# Patient Record
Sex: FEMALE | Race: WHITE | NOT HISPANIC OR LATINO | URBAN - METROPOLITAN AREA
[De-identification: names, ages, dates, MRNs, and addresses within clinical notes are randomized per-mention and may not be internally consistent; named-entity substitution may affect disease eponyms.]

---

## 2018-01-09 NOTE — RESULT NOTES
Verified Results  (1) THIN PREP PAP WITH IMAGING [id number] Kaylen Sanchez     Test Name Result Flag Reference   LAB AP CASE REPORT (Report)     Gynecologic Cytology Report            Case: [id number]                  Authorizing Provider: VERA Hair    Collected:      10/27/2016 1103        First Screen:     CHRISTIE Toledo Received:      11/02/2016 1103        Specimen:  LIQUID-BASED PAP, SCREENING, Cervix, Endocervical   LAB AP GYN PRIMARY INTERPRETATION      Negative for intraepithelial lesion or malignancy  Electronically signed by CHRISTIE Toledo on 11/4/2016 at 12:00 PM   LAB AP GYN SPECIMEN ADEQUACY      Satisfactory for evaluation  Partially obscuring blood  LAB AP GYN ADDITIONAL INFORMATION (Report)     [facility] FDA approved ,  and ThinPrep Imaging System are   utilized with strict adherence to the 's instruction manual to   prepare gynecologic and non-gynecologic cytology specimens for the   production of ThinPrep slides as well as for gynecologic ThinPrep imaging  These processes have been validated by our laboratory and/or by the     The Pap test is not a diagnostic procedure and should not be used as the   sole means to detect cervical cancer  It is only a screening procedure to   aid in the detection of cervical cancer and its precursors  Both   false-negative and false-positive results have been experienced  Your   patient's test result should be interpreted in this context together with   the history and clinical findings     LAB AP LMP

## 2023-06-30 ENCOUNTER — OFFICE VISIT (OUTPATIENT)
Dept: PRIMARY CARE | Facility: CLINIC | Age: 33
End: 2023-06-30
Payer: COMMERCIAL

## 2023-06-30 VITALS — WEIGHT: 125 LBS | HEIGHT: 68 IN | BODY MASS INDEX: 18.94 KG/M2

## 2023-06-30 DIAGNOSIS — Z00.00 ROUTINE GENERAL MEDICAL EXAMINATION AT A HEALTH CARE FACILITY: Primary | ICD-10-CM

## 2023-06-30 LAB
ALANINE AMINOTRANSFERASE (SGPT) (U/L) IN SER/PLAS: 10 U/L (ref 7–45)
ALBUMIN (G/DL) IN SER/PLAS: 4.7 G/DL (ref 3.4–5)
ALKALINE PHOSPHATASE (U/L) IN SER/PLAS: 49 U/L (ref 33–110)
ANION GAP IN SER/PLAS: 13 MMOL/L (ref 10–20)
ASPARTATE AMINOTRANSFERASE (SGOT) (U/L) IN SER/PLAS: 16 U/L (ref 9–39)
BASOPHILS (10*3/UL) IN BLOOD BY AUTOMATED COUNT: 0.08 X10E9/L (ref 0–0.1)
BASOPHILS/100 LEUKOCYTES IN BLOOD BY AUTOMATED COUNT: 1.5 % (ref 0–2)
BILIRUBIN TOTAL (MG/DL) IN SER/PLAS: 0.6 MG/DL (ref 0–1.2)
CALCIDIOL (25 OH VITAMIN D3) (NG/ML) IN SER/PLAS: 31 NG/ML
CALCIUM (MG/DL) IN SER/PLAS: 9.8 MG/DL (ref 8.6–10.6)
CARBON DIOXIDE, TOTAL (MMOL/L) IN SER/PLAS: 27 MMOL/L (ref 21–32)
CHLORIDE (MMOL/L) IN SER/PLAS: 103 MMOL/L (ref 98–107)
CHOLESTEROL (MG/DL) IN SER/PLAS: 179 MG/DL (ref 0–199)
CHOLESTEROL IN HDL (MG/DL) IN SER/PLAS: 66.8 MG/DL
CHOLESTEROL/HDL RATIO: 2.7
CREATININE (MG/DL) IN SER/PLAS: 0.77 MG/DL (ref 0.5–1.05)
EOSINOPHILS (10*3/UL) IN BLOOD BY AUTOMATED COUNT: 0.09 X10E9/L (ref 0–0.7)
EOSINOPHILS/100 LEUKOCYTES IN BLOOD BY AUTOMATED COUNT: 1.7 % (ref 0–6)
ERYTHROCYTE DISTRIBUTION WIDTH (RATIO) BY AUTOMATED COUNT: 12.9 % (ref 11.5–14.5)
ERYTHROCYTE MEAN CORPUSCULAR HEMOGLOBIN CONCENTRATION (G/DL) BY AUTOMATED: 31.6 G/DL (ref 32–36)
ERYTHROCYTE MEAN CORPUSCULAR VOLUME (FL) BY AUTOMATED COUNT: 95 FL (ref 80–100)
ERYTHROCYTES (10*6/UL) IN BLOOD BY AUTOMATED COUNT: 4.39 X10E12/L (ref 4–5.2)
ESTIMATED AVERAGE GLUCOSE FOR HBA1C: 103 MG/DL
GFR FEMALE: >90 ML/MIN/1.73M2
GLUCOSE (MG/DL) IN SER/PLAS: 63 MG/DL (ref 74–99)
HEMATOCRIT (%) IN BLOOD BY AUTOMATED COUNT: 41.5 % (ref 36–46)
HEMOGLOBIN (G/DL) IN BLOOD: 13.1 G/DL (ref 12–16)
HEMOGLOBIN A1C/HEMOGLOBIN TOTAL IN BLOOD: 5.2 %
IMMATURE GRANULOCYTES/100 LEUKOCYTES IN BLOOD BY AUTOMATED COUNT: 0.2 % (ref 0–0.9)
LDL: 87 MG/DL (ref 0–99)
LEUKOCYTES (10*3/UL) IN BLOOD BY AUTOMATED COUNT: 5.3 X10E9/L (ref 4.4–11.3)
LYMPHOCYTES (10*3/UL) IN BLOOD BY AUTOMATED COUNT: 1.8 X10E9/L (ref 1.2–4.8)
LYMPHOCYTES/100 LEUKOCYTES IN BLOOD BY AUTOMATED COUNT: 33.7 % (ref 13–44)
MONOCYTES (10*3/UL) IN BLOOD BY AUTOMATED COUNT: 0.55 X10E9/L (ref 0.1–1)
MONOCYTES/100 LEUKOCYTES IN BLOOD BY AUTOMATED COUNT: 10.3 % (ref 2–10)
NEUTROPHILS (10*3/UL) IN BLOOD BY AUTOMATED COUNT: 2.81 X10E9/L (ref 1.2–7.7)
NEUTROPHILS/100 LEUKOCYTES IN BLOOD BY AUTOMATED COUNT: 52.6 % (ref 40–80)
NRBC (PER 100 WBCS) BY AUTOMATED COUNT: 0 /100 WBC (ref 0–0)
PLATELETS (10*3/UL) IN BLOOD AUTOMATED COUNT: 271 X10E9/L (ref 150–450)
POTASSIUM (MMOL/L) IN SER/PLAS: 4.1 MMOL/L (ref 3.5–5.3)
PROTEIN TOTAL: 7.4 G/DL (ref 6.4–8.2)
SODIUM (MMOL/L) IN SER/PLAS: 139 MMOL/L (ref 136–145)
THYROTROPIN (MIU/L) IN SER/PLAS BY DETECTION LIMIT <= 0.05 MIU/L: 1.61 MIU/L (ref 0.44–3.98)
TRIGLYCERIDE (MG/DL) IN SER/PLAS: 124 MG/DL (ref 0–149)
UREA NITROGEN (MG/DL) IN SER/PLAS: 17 MG/DL (ref 6–23)
VLDL: 25 MG/DL (ref 0–40)

## 2023-06-30 PROCEDURE — 85025 COMPLETE CBC W/AUTO DIFF WBC: CPT

## 2023-06-30 PROCEDURE — 99395 PREV VISIT EST AGE 18-39: CPT | Performed by: INTERNAL MEDICINE

## 2023-06-30 PROCEDURE — 80061 LIPID PANEL: CPT

## 2023-06-30 PROCEDURE — 82306 VITAMIN D 25 HYDROXY: CPT

## 2023-06-30 PROCEDURE — 83036 HEMOGLOBIN GLYCOSYLATED A1C: CPT

## 2023-06-30 PROCEDURE — 80053 COMPREHEN METABOLIC PANEL: CPT

## 2023-06-30 PROCEDURE — 84443 ASSAY THYROID STIM HORMONE: CPT

## 2023-06-30 NOTE — PROGRESS NOTES
"  Irma Dobson is a 31 yo F Presenting for wellness visit; last seen in 2017.     1. Bechets disease   -diagnosed 11 yo after presenting with genital ulcers, h/o oral apthous ulcers dating to early childhood   -last genital ulcerations or posterior pharynx ulcerations at 17 yo, still consistent apthous ulcers with stress, fatigue or oral trauma   -manages with colchicine   -has used prednisone tapers in past but not in a long time   -last summer: vesicular rash across torso, self resolved  -occ lesions along fingers, tender - come and go   [ ]prednisone bursts as needed       #HM   -due labs   -pap 6.23 with gyne   -tdap 2022     68\"   125 lbs in 6.23       Gen Aox3 NAD well appearing   HEENT mmm pharynx clear no cervical LAD   Eyes sclerae clear   CV rrr nl s1/2 no m/r/g  Pulm CTAB no adventitious sounds   Ext warm dry no edema 2+ DP pulse     "

## 2023-06-30 NOTE — PATIENT INSTRUCTIONS
Irma,     Call the office of Dr. Vero Hunter to schedule dermatology outside of system; you will also be called by  scheduling to see what they can offer. [phone].

## 2023-11-03 ENCOUNTER — PHARMACY VISIT (OUTPATIENT)
Dept: PHARMACY | Facility: CLINIC | Age: 33
End: 2023-11-03
Payer: COMMERCIAL

## 2023-11-03 PROCEDURE — RXMED WILLOW AMBULATORY MEDICATION CHARGE

## 2023-11-03 RX ORDER — BENZONATATE 100 MG/1
100 CAPSULE ORAL EVERY 8 HOURS
Qty: 30 CAPSULE | Refills: 2 | OUTPATIENT
Start: 2023-11-03 | End: 2024-04-24 | Stop reason: WASHOUT

## 2023-11-03 RX ORDER — ONDANSETRON 4 MG/1
4 TABLET, ORALLY DISINTEGRATING ORAL EVERY 6 HOURS
Qty: 30 TABLET | Refills: 2 | OUTPATIENT
Start: 2023-11-03 | End: 2024-04-24 | Stop reason: WASHOUT

## 2024-01-22 PROCEDURE — RXMED WILLOW AMBULATORY MEDICATION CHARGE

## 2024-01-24 ENCOUNTER — PHARMACY VISIT (OUTPATIENT)
Dept: PHARMACY | Facility: CLINIC | Age: 34
End: 2024-01-24
Payer: COMMERCIAL

## 2024-04-08 ASSESSMENT — LIFESTYLE VARIABLES
HISTORY_ALCOHOL_USE: YES
TOBACCO_USE: NO

## 2024-04-08 NOTE — PROGRESS NOTES
Visit Type: In Person    NEW FERTILITY PATIENT VISIT    Referred by: Referred by:: No one  Zulay Butcher  Accompanied today by: Who is accompanying you to your visit:: No one  self    Irma Dobson is a 33 y.o.  female who presents for fertility consultation. IUD removed 2023. She has tried 3 rounds of letrozole (2.5mg x 2, 5mg x1). OPKs + on CD13 or 14.     With her prior pregnancy, she underwent one round of Letrozole with timed intercourse to assist with conception. Also used luteal progesterone.    History notable for:  - Behcet's disease manifesting as vaginal/oral ulcers  - Factor V Leiden heterozygosity (was not on anticoagulation in pregnancy)  - Partner with family history of congenital heart disease    PRIOR EVALUATION / TREATMENT  [facility] in   SIS unremarkable  AMH 0.88 in 2021  FSH 10.06  E2 48.9  Partner SA  Volume 4.36 mL  Conc 99 million/mL  Motility 49%   million  Morphology 9%    Prior Labs  Lab Results    Date Done      AMH: No results found for requested labs within last 1825 days. No results found for requested labs within last 1825 days.   TSH: 1.61 (Ref range: 0.44 - 3.98 mIU/L) 2023   HgbA1c: 5.2 (Ref range: %) 2023   Hepatitis B surface antigen: NONREACTIVE (Ref range: NONREACTIVE) 2021   Hepatitis C antibody: NONREACTIVE (Ref range: NONREACTIVE) 2021   HIV ½ Antigen Antibody screen with reflex: NONREACTIVE (Ref range: NONREACTIVE) 2021   Syphilis screening with reflex: No results found for requested labs within last 1825 days. 3/18/2022   GC: NEGATIVE (Ref range: Negative) 2021   CT: NEGATIVE (Ref range: Negative) 2021   Type and Screen: A 3/18/2022   Rh: POS 3/18/2022   Rubella: POSITIVE (Ref range: ) 2021   Creatinine: 0.77 (Ref range: 0.50 - 1.05 mg/dL) 2023   AST:16 (Ref range: 9 - 39 U/L) 2023   ALT:10 (Ref range: 7 - 45 U/L): 2023     Relationship Status:        OB Hx     3/18/2022  at  37.1wga ([facility]), 2nd degree tear, boy (Keerthi Barrera), Birth weight was 7 lbs, 13 oz; interval PP IUD placed     GYN HISTORY   Have you ever been diagnosed with a sexually transmitted disease?: No  Have you ever had Pelvic Inflammatory Disease?: No  Have you had an abnormal PAP smear?: No  Date & Result of last PAP smear: 6/16/23 NIL/HR HPV neg  Have you ever had an abnormal mammogram?: No  Date & result of your last mammogram:  na  Do you have pelvic pain?: No  If applicable, how many times a week are you having intercourse, trying to get pregnant during your fertile window?: 3-4  Timing with ovulation  Do you have pain with intercourse?: No  Do you use lubricants with intercourse?  no  Do you have pain with bowel movements?: No  Do you have pain with a full bladder?: No    MENSTRUAL HISTORY  LMP: 3/17/2024  If applicable, when was your first occurrence of menstruation?: 12  Contraception:  none currently, IUD removed 11/2023  What is the average number of days between menstrual cycles?: 28  Describe your bleeding:: Average  Are your menstrual periods painful?: No    ENDOCRINE/INFERTILITY HISTORY  If applicable, what is the approximate date you began trying to get pregnant?: 6 months  If applicable, how many times a week are you having intercourse, trying to get pregnant during your fertile window?: 3-4  Do you experience any loss of milk or liquid discharge from the breasts?: No  Are you experiencing any vision changes?: No  Are you experiencing headaches?: No  Are you experiencing persistent or worsening hair growth on the face, breasts or lower abdomen?: No  Are you experiencing loss of hair from your scalp?: No  Are you experiencing acne?: No  Oily skin?: No  Recent weight change   Are you experiencing any increase in weight?: No  Are you experiencing any decrease in weight?: No  Exercise more than 3 times a week?: Yes  2-3 weeks PelEkronn    [facility]  See above    MEDICATIONS  Current Outpatient Medications on File  Prior to Visit   Medication Sig Dispense Refill    benzonatate (Tessalon) 100 mg capsule Take 1 capsule (100 mg) by mouth every 8 hours as needed for coughing. 30 capsule 2    ondansetron ODT (Zofran-ODT) 4 mg disintegrating tablet Dissolve 1 tablet (4 mg) in mouth every 6 hours as needed for nausea 30 tablet 2    progesterone (Prometrium) 200 mg capsule insert 2 capsules vaginally twice daily, start 3 days post-ovulation. if pregnant, continue until 10 weeks gestation. If not pregnant, discontinue. 100 capsule 1     No current facility-administered medications on file prior to visit.      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[facility])  Have you ever been incarcerated?: No  Do you have a history of domestic violence?: No  Do you feel safe at home?: Yes  Do you have a history of any negative sexual experience such as incest or rape?: No    PARTNER HISTORY  Partner name:: Hood Barrera (JJ)  Partner :: 10/02/90  Partner occupation:: General Surgery Resident  Partner Prior Fertility History:: Our 1 child together, same as above  Partner Past Medical History:: None  Partner Past Surgical History:: None  Partner: Recent or current tobacco use: No  Partner: Recent or current alcohol use: Yes  Partner: Recent or current drug use: No  Partner Medications: None  Partner: Any history of surgeries or injuries in the reproductive area?: No  Have you ever been diagnosed with a sexually transmitted  "disease?: No  Please select all that are applicable:    SA: Prior Semen Analysis completed?: Yes  SA Results: Where the results normal?: Yes    FAMILY HISTORY  Father  of MI in 2020 (Factor V Leiden)    CANCER HISTORY    Breast: No  Ovarian: No  Colon: No  Endometrial: Endometrial Cancer: Please answer Yes, No or Unsure. If Yes, please, identify which family member.: Yes, mother    FAMILY VTE HISTORY  Blood Clots: Please answer Yes, No or Unsure. If Yes, please, identify which family member.: No    GENETIC HISTORY  Ethnic Background  Patient: Ethnic Background Patient:: White Non   Partner: Ethnic Background Partner:: White Non   Genetic Disease in Family  Patient and Father have Factor V Leiden heterozygosity   Partner: Partner: Defects and genetic syndromes? Please answer Yes, No or Unsure: No  Birth Defects in Family  Patient: Patient: Birth defects? Please answer Yes, No or Unsure: No  Partner: brother has congenitally corrected transposition and dextracardia, father has mitral valve prolapse and A-Fib  Genetic screening performed previously:  Genome sequencing for both patient and partner performed, negative other than patient Factor V     BMI:   BMI Readings from Last 1 Encounters:   04/10/24 19.01 kg/m²     VITALS:  /66   Pulse 59   Temp 36.9 °C (98.5 °F)   Ht 1.727 m (5' 8\")   Wt 56.7 kg (125 lb)   LMP 2024 (Exact Date)   BMI 19.01 kg/m²     ASSESSMENT   33 y.o. No obstetric history on file. female with unexplained infertility.    Partner SA:  Prior normal, ordered semen freeze    COUNSELING  We discussed causes of infertility including hormonal, egg quality issues, structural problems such as endometriosis, adhesions, or tubal problems, uterine factors such as polyps or fibroids, and sperm issues. Reviewed evaluation of such as well. We discussed various methods for achieving pregnancy in some detail including, ovulation induction, insemination, superovulation and " IVF.    We discussed that Letrozole is used for ovulation induction and that recent studies have found letrozole is superior to Clomid for ovulation induction in patients with PCOS. We discussed common side effects of Letrozole including headaches, vision changes, hot flashes, mood changes, and breast tenderness.  Letrozole is NOT FDA approved for the treatment of infertility and was initially associated with (in some studies) a higher risk of congenital anomalies, although this was not found in a more recent large study of patients taking Letrozole for unexplained infertility. Patients must take a pregnancy test prior to starting Letrozole each month. We discussed that there is an increased risk of multiple gestation with Letrozole approximately 10% for twins and less than 1% for triplets.  Counseled regarding option of selective reduction for higher order multiples.    Routine Testing  Fertility Center  STDs Within 1 year   Genetic carrier Waiver/Completed   T&S Within 1 year   AMH Within 1 year   TSH Within 1 year   Rubella/Varicella Within 5 years     BMI Testing  Fertility Center  CBC Within 1 year   CMP Within 1 year   HgbA1c Within 1 year   Mag, Phos, Vit D <18 Within 1 year   MFM > 40  REQ   Wt loss consult > 40 OPT     PLAN  Orders Placed This Encounter   Procedures    Hysteroscopy diagnostic    Hysterosalpingogram (HSG)    FL hysterosalpingogram    US pelvis transvaginal    Antimullerian Hormone (Amh)    Type And Screen    Rubella Antibody, Igg    Varicella Zoster Antibody, Igg    Hepatitis B surface antigen    Hepatitis C Antibody    HIV 1/2 Antigen/Antibody Screen with Reflex to Confirmation    Syphilis Screen with Reflex    C. Trachomatis / N. Gonorrhoeae, Amplified Detection    POCT pregnancy, urine manually resulted    POCT pregnancy, urine manually resulted   Evaluation with TVUS, HSG, AMH, prenatals and STIs  Plan for letrozole 5mg/TIC x 1 cycle  Partner to have frozen semen vials collected for  possible future IUIs/IVF given challenging schedule  If letrozole/TIC unsuccessful, plan for letrozole 5mg with IUI x2 cycles  After 3 cycles plan to move on to IVF, may obtain diagnostic hysteroscopy between above cycles to prepare    GENETIC SCREENING PATIENT  Completed previously    PARTNER  Yes Semen Analysis: Completed previous  Yes Genetic screening: Completed previously    FOLLOW UP   Consults:  None at this time  Chart to primary nurse for care coordination and patient check list/education  Enroll in Engaged MD  Take prenatal vitamins, vitamin D 2000 IUs daily  Discussed that PAP and mammogram must be updated if appropriate based on age and clinical history and results received before treatment can begin  Discussed that treatment cannot proceed until checklist items are complete   6 week follow up with MD  Additional testing for BMI < 18 or > 40: No  Sperm Donor:  n/a    MD Completion:  Ectopic Risk: No  Medically Complex: No    Fertility Plan Update: Letrozole/TIC x1, letrozole/IUI x2, proceed to IVF    Seen and d/w Dr. Petra Gracia  04/10/2024  10:22 AM    Agree with above, will  need IVF consult if plans IVF  RYAN PACK on 4/11/24 at 1:28 PM.

## 2024-04-10 ENCOUNTER — LAB (OUTPATIENT)
Dept: LAB | Facility: LAB | Age: 34
End: 2024-04-10
Payer: COMMERCIAL

## 2024-04-10 ENCOUNTER — CONSULT (OUTPATIENT)
Dept: ENDOCRINOLOGY | Facility: CLINIC | Age: 34
End: 2024-04-10
Payer: COMMERCIAL

## 2024-04-10 VITALS
HEIGHT: 68 IN | WEIGHT: 125 LBS | HEART RATE: 59 BPM | SYSTOLIC BLOOD PRESSURE: 102 MMHG | DIASTOLIC BLOOD PRESSURE: 66 MMHG | BODY MASS INDEX: 18.94 KG/M2 | TEMPERATURE: 98.5 F

## 2024-04-10 DIAGNOSIS — Z11.3 SCREENING FOR STDS (SEXUALLY TRANSMITTED DISEASES): ICD-10-CM

## 2024-04-10 DIAGNOSIS — Z01.83 ENCOUNTER FOR RH BLOOD TYPING: ICD-10-CM

## 2024-04-10 DIAGNOSIS — Z11.59 ENCOUNTER FOR SCREENING FOR OTHER VIRAL DISEASES: ICD-10-CM

## 2024-04-10 DIAGNOSIS — N97.9 FEMALE INFERTILITY: ICD-10-CM

## 2024-04-10 DIAGNOSIS — Z31.41 FERTILITY TESTING: ICD-10-CM

## 2024-04-10 DIAGNOSIS — Z31.89 ENCOUNTER FOR ARTIFICIAL INSEMINATION: ICD-10-CM

## 2024-04-10 DIAGNOSIS — Z01.812 ENCOUNTER FOR PREPROCEDURAL LABORATORY EXAMINATION: ICD-10-CM

## 2024-04-10 DIAGNOSIS — Z31.41 FERTILITY TESTING: Primary | ICD-10-CM

## 2024-04-10 LAB
ABO GROUP (TYPE) IN BLOOD: NORMAL
ANTIBODY SCREEN: NORMAL
HBV SURFACE AG SERPL QL IA: NONREACTIVE
HCV AB SER QL: NONREACTIVE
HIV 1+2 AB+HIV1 P24 AG SERPL QL IA: NONREACTIVE
RH FACTOR (ANTIGEN D): NORMAL
RUBV IGG SERPL IA-ACNC: 3.3 IA
RUBV IGG SERPL QL IA: POSITIVE
TREPONEMA PALLIDUM IGG+IGM AB [PRESENCE] IN SERUM OR PLASMA BY IMMUNOASSAY: NONREACTIVE
VARICELLA ZOSTER IGG INDEX: 3.1 IA
VZV IGG SER QL IA: POSITIVE

## 2024-04-10 PROCEDURE — 86850 RBC ANTIBODY SCREEN: CPT

## 2024-04-10 PROCEDURE — 87800 DETECT AGNT MULT DNA DIREC: CPT

## 2024-04-10 PROCEDURE — 86780 TREPONEMA PALLIDUM: CPT

## 2024-04-10 PROCEDURE — 36415 COLL VENOUS BLD VENIPUNCTURE: CPT

## 2024-04-10 PROCEDURE — 99214 OFFICE O/P EST MOD 30 MIN: CPT | Mod: GC | Performed by: OBSTETRICS & GYNECOLOGY

## 2024-04-10 PROCEDURE — 87340 HEPATITIS B SURFACE AG IA: CPT

## 2024-04-10 PROCEDURE — 99214 OFFICE O/P EST MOD 30 MIN: CPT | Performed by: OBSTETRICS & GYNECOLOGY

## 2024-04-10 PROCEDURE — 87389 HIV-1 AG W/HIV-1&-2 AB AG IA: CPT

## 2024-04-10 PROCEDURE — 86787 VARICELLA-ZOSTER ANTIBODY: CPT

## 2024-04-10 PROCEDURE — 86901 BLOOD TYPING SEROLOGIC RH(D): CPT

## 2024-04-10 PROCEDURE — 86317 IMMUNOASSAY INFECTIOUS AGENT: CPT

## 2024-04-10 PROCEDURE — 86803 HEPATITIS C AB TEST: CPT

## 2024-04-10 PROCEDURE — 86900 BLOOD TYPING SEROLOGIC ABO: CPT

## 2024-04-10 PROCEDURE — RXMED WILLOW AMBULATORY MEDICATION CHARGE

## 2024-04-10 PROCEDURE — 83516 IMMUNOASSAY NONANTIBODY: CPT

## 2024-04-10 RX ORDER — LETROZOLE 2.5 MG/1
5 TABLET, FILM COATED ORAL DAILY
Qty: 10 TABLET | Refills: 2 | Status: SHIPPED | OUTPATIENT
Start: 2024-04-10 | End: 2024-07-09

## 2024-04-10 ASSESSMENT — COLUMBIA-SUICIDE SEVERITY RATING SCALE - C-SSRS
6. HAVE YOU EVER DONE ANYTHING, STARTED TO DO ANYTHING, OR PREPARED TO DO ANYTHING TO END YOUR LIFE?: NO
1. IN THE PAST MONTH, HAVE YOU WISHED YOU WERE DEAD OR WISHED YOU COULD GO TO SLEEP AND NOT WAKE UP?: NO
2. HAVE YOU ACTUALLY HAD ANY THOUGHTS OF KILLING YOURSELF?: NO

## 2024-04-10 ASSESSMENT — PATIENT HEALTH QUESTIONNAIRE - PHQ9
1. LITTLE INTEREST OR PLEASURE IN DOING THINGS: NOT AT ALL
2. FEELING DOWN, DEPRESSED OR HOPELESS: NOT AT ALL
SUM OF ALL RESPONSES TO PHQ9 QUESTIONS 1 AND 2: 0

## 2024-04-10 ASSESSMENT — PAIN SCALES - GENERAL: PAINLEVEL: 0-NO PAIN

## 2024-04-10 NOTE — PROGRESS NOTES
Boarding Pass Oral/Injectible with TIC/IUI Checklist    Age: 33 y.o.    Provider: Юлия Gracia MD  Primary RN: Darya Oliveira  Reasons for Treatment: Unexplained Infertility  Last BMI  04/10/24 : 19.01 kg/m²       Past Medical History:   Diagnosis Date    Other specified health status     No pertinent past medical history       Date Done Consultation Results/Comments   4/10/24 Medication Protocol   Fertility Plan Update:: Letrozole 5mg on CD3-7, OPK, TIC x1; Letrozole/IUI x2; if unsuccessful schedule IVF consult  Trigger plan:  none  Adjuncts:  none  Notes: none   4/10/2024 Procedure Order Placed [x]  No order required for OI/TIC, placed order for semen freeze and future IUIs if needed    MFM Consult Okay to proceed? N/A    Psych Consult Okay to proceed? N/A    Genetics Consult Okay to proceed? Yes    Other    Date Done Female Labs Results/Comments   4/10/2024 T&S (Q 1 Year) A+, antibody negative     4/10/2024 Hep B sAg Nonreactive   4/10/2024 Hep C AB Nonreactive   4/10/2024 HIV Nonreactive   4/10/2024 Syphilis Nonreactive   4/10/2024 GC/CT GC: Negative  CT: Negative   4/10/2024 Rubella (Q 5 Years) Positive   4/10/2024 Varicella (Q 5 Years) Positive   2023 TSH 1.61 (Ref range: 0.44 - 3.98 mIU/L)   2023 HgbA1C 5.2 (Ref range: %)   4/10/2024 AMH 1.652   4/10/24 Carrier Screening Previously performed, reviewed no common carrier status between partners   24, 24 Uterine Cavity Eval Pelvic US: fluid in cavity, multifollicular ovaries    HSG: bilateral tubal patency, possible right cornual filling defect     SIS: n/a    Hyster: patient may complete between IUI cycles to prepare for possible IVF     2023 Pap Smear Lab Results   Component Value Date    CVTFINALDX  2023     A.  THINPREP PAP CERVICAL:              Specimen Adequacy:       SATISFACTORY FOR EVALUATION.       Quality Indicator: Absence of endocervical/transformation zone component.       Quality Indicator: Partially  obscuring inflammation.              General Categorization:       NEGATIVE FOR INTRAEPITHELIAL LESION OR MALIGNANCY.                            HIGH RISK HPV TEST RESULT:                       HPV GENOTYPE  16                      NEGATIVE       HPV GENOTYPE  18                      NEGATIVE       HPV GENOTYPE  OTHER             NEGATIVE              Reference Range: Negative                       Mammogram N/a              Date Done Male Labs (required if IUI)   Results/Comments  N/a (for TIC)    Hep B sAg N/a    Hep C AB  N/a    HIV N/a    Syphilis N/a    GC/CT GC: N/a  CT: N/a   4/10/24 Carrier Screening Previously performed, reviewed no common carrier status between partners   05/2021 Semen Analysis  Volume(mL): 4.36  Count(million): 99  Motility(%): 49  Motile Count(million): 212   Date Done Miscellaneous Results/Comments    BMI Checklist  BMI > 40 or < 18 Added to chart:   No    >= 45 Checklist  Added to chart:   No     MD Completion:  Ectopic Risk: No  Medically Complex: No    Patient approved for letrozole 5mg/TIC x 1 cycle. If letrozole/TIC unsuccessful, approved for letrozole 5mg with IUI x2 cycles. Partner to have frozen semen vials collected for possible future IUIs/IVF given challenging schedule. After 3 cycles plan to move on to IVF.    Reviewed and approved by ANA MARÍA JASON on 4/16/24 at 3:35 PM.

## 2024-04-11 LAB
C TRACH RRNA SPEC QL NAA+PROBE: NEGATIVE
N GONORRHOEA DNA SPEC QL PROBE+SIG AMP: NEGATIVE

## 2024-04-13 ENCOUNTER — PHARMACY VISIT (OUTPATIENT)
Dept: PHARMACY | Facility: CLINIC | Age: 34
End: 2024-04-13
Payer: COMMERCIAL

## 2024-04-14 LAB — MIS SERPL-MCNC: 1.65 NG/ML (ref 0.18–11.71)

## 2024-04-19 ENCOUNTER — HOSPITAL ENCOUNTER (OUTPATIENT)
Dept: RADIOLOGY | Facility: HOSPITAL | Age: 34
Discharge: HOME | End: 2024-04-19
Payer: COMMERCIAL

## 2024-04-19 ENCOUNTER — ANCILLARY PROCEDURE (OUTPATIENT)
Dept: ENDOCRINOLOGY | Facility: CLINIC | Age: 34
End: 2024-04-19
Payer: COMMERCIAL

## 2024-04-19 DIAGNOSIS — Z01.812 ENCOUNTER FOR PREPROCEDURAL LABORATORY EXAMINATION: ICD-10-CM

## 2024-04-19 DIAGNOSIS — Z31.41 FERTILITY TESTING: ICD-10-CM

## 2024-04-19 DIAGNOSIS — Z31.41 FERTILITY TESTING: Primary | ICD-10-CM

## 2024-04-19 LAB — PREGNANCY TEST URINE, POC: NEGATIVE

## 2024-04-19 PROCEDURE — 2550000001 HC RX 255 CONTRASTS: Performed by: OBSTETRICS & GYNECOLOGY

## 2024-04-19 PROCEDURE — 74740 X-RAY FEMALE GENITAL TRACT: CPT

## 2024-04-19 PROCEDURE — 81025 URINE PREGNANCY TEST: CPT | Performed by: STUDENT IN AN ORGANIZED HEALTH CARE EDUCATION/TRAINING PROGRAM

## 2024-04-19 PROCEDURE — 58340 CATHETER FOR HYSTEROGRAPHY: CPT

## 2024-04-19 RX ADMIN — IOHEXOL 20 ML: 240 INJECTION, SOLUTION INTRATHECAL; INTRAVASCULAR; INTRAVENOUS; ORAL at 08:02

## 2024-04-19 NOTE — PROGRESS NOTES
Patient ID: Irma Dobson is a 33 y.o. female.    HSG    Date/Time: 4/19/2024 7:40 AM    Performed by: CHENG Maharaj  Authorized by: CHENG Maharaj    Consent:     Consent obtained:  Verbal and written    Consent given by:  Patient    Risks, benefits, and alternatives were discussed: yes      Risks discussed:  Bleeding, infection and pain  Universal protocol:     Procedure explained and questions answered to patient or proxy's satisfaction: yes      Relevant documents present and verified: yes      Test results available: yes      Imaging studies available: yes      Required blood products, implants, devices, and special equipment available: yes      Immediately prior to procedure, a time out was called: yes      Patient identity confirmed:  Verbally with patient, arm band and hospital-assigned identification number  Indications:     Indications:  Fertility testing  Pre-procedure details:     Skin preparation:  Povidone-iodine  Sedation:     Sedation type:  None  Anesthesia:     Anesthesia method:  None  Procedure specific details:      Assistant: SHELBY Adame CNP      Hysterosalpingogram (HSG) risks, benefits, alternatives, and personnel discussed with patient who agreed to proceed.    Procedural time out        Done in room where procedure done: Yes        Done just before starting procedure: Yes                                                 All members of procedural team involved in time-out: Yes                  Active communication used: Yes                                                         All team members agreed on procedure: Yes                                        Patient correctly identified by two identifiers: Yes                                  Correct side and site identified: Yes                                                     All needed special equipment/instruments available: Yes               Prior to the start of the procedure a time out was taken and the following  were verified: The identity of the patient using two patient identifiers.   Urine pregnancy test was performed and was negative.   Risks, benefits, and alternatives of the procedure were explained to the patient.  Informed consent was obtained.     The patient was placed in the dorsal lithotomy position and a sterile speculum was placed in the vagina. The cervix was sterilized with Betadine x 3. The anterior lip of the cervix was grasped with a single-tooth tenaculum. The acorn cannula was then placed in the cervix. The patient was positioned and images were taken with fluoroscopy as dye was inserted through the cannula. All instruments were then removed. The patient tolerated the procedure well and was discharged home the same day without complications.    PRELIMINARY NURSE PRACTITIONER ASSESSMENT - FOR A FINAL REPORT, PLEASE REFER TO THE FINALIZED DOCUMENTATION IN THE IMAGING TAB   Uterus:  normal contour with filling defects vs bubbles towards the right cornua  Tubes:  bilateral patency with free spill of dye, normal tubal architecture noted, and no loculations present.  Based on these findings, my recommendation is: Follow up required, chart forwarded to primary MD.   Dr. Ku reviewed the images and agrees with the findings, recommend hysteroscopy in the IVF SC without any sedation to address the findings. Patient will call back with day 1 of next menstrual cycle to schedule, was planning to do hysteroscopy in preparation for IVF.      The patient was counseled regarding the above preliminary findings and understands these will be reviewed by the reading physician.     Megan Adame, CNP04/19/24 2:46 PM     Post-procedure details:     Procedure completion:  Tolerated well, no immediate complications

## 2024-04-19 NOTE — Clinical Note
Agus Vigil, I wanted to let you know that I saw Irma for an HSG today, wiliam tubal patency, but poss filling defects noted at right cornua, will schedule hysteroscopy with next menses, Megan :)

## 2024-04-22 ENCOUNTER — ANCILLARY PROCEDURE (OUTPATIENT)
Dept: ENDOCRINOLOGY | Facility: CLINIC | Age: 34
End: 2024-04-22
Payer: COMMERCIAL

## 2024-04-22 DIAGNOSIS — Z31.41 FERTILITY TESTING: ICD-10-CM

## 2024-04-22 PROCEDURE — 76830 TRANSVAGINAL US NON-OB: CPT

## 2024-04-22 PROCEDURE — 76830 TRANSVAGINAL US NON-OB: CPT | Performed by: OBSTETRICS & GYNECOLOGY

## 2024-07-13 ENCOUNTER — HOSPITAL ENCOUNTER (EMERGENCY)
Facility: HOSPITAL | Age: 34
Discharge: STILL ADMITTED OR ADMINISTRATIVE | End: 2024-07-13
Payer: COMMERCIAL

## 2024-07-13 PROCEDURE — 4500999001 HC ED NO CHARGE

## 2024-07-15 ENCOUNTER — HOSPITAL ENCOUNTER (OUTPATIENT)
Dept: RADIOLOGY | Facility: HOSPITAL | Age: 34
Discharge: HOME | End: 2024-07-15
Payer: COMMERCIAL

## 2024-07-15 ENCOUNTER — INITIAL PRENATAL (OUTPATIENT)
Dept: OBSTETRICS AND GYNECOLOGY | Facility: CLINIC | Age: 34
End: 2024-07-15
Payer: COMMERCIAL

## 2024-07-15 ENCOUNTER — LAB (OUTPATIENT)
Dept: LAB | Facility: LAB | Age: 34
End: 2024-07-15
Payer: COMMERCIAL

## 2024-07-15 VITALS
DIASTOLIC BLOOD PRESSURE: 66 MMHG | HEART RATE: 70 BPM | BODY MASS INDEX: 18.63 KG/M2 | WEIGHT: 122.5 LBS | SYSTOLIC BLOOD PRESSURE: 114 MMHG

## 2024-07-15 DIAGNOSIS — Z3A.13 13 WEEKS GESTATION OF PREGNANCY (HHS-HCC): ICD-10-CM

## 2024-07-15 DIAGNOSIS — Z82.79 FAMILY HISTORY OF FIRST DEGREE RELATIVE WITH CONGENITAL HEART DISEASE: ICD-10-CM

## 2024-07-15 DIAGNOSIS — D68.51 HETEROZYGOUS FACTOR V LEIDEN AFFECTING PREGNANCY IN FIRST TRIMESTER, ANTEPARTUM (MULTI): Primary | ICD-10-CM

## 2024-07-15 DIAGNOSIS — O99.111 HETEROZYGOUS FACTOR V LEIDEN AFFECTING PREGNANCY IN FIRST TRIMESTER, ANTEPARTUM (MULTI): Primary | ICD-10-CM

## 2024-07-15 DIAGNOSIS — R63.6 UNDERWEIGHT: ICD-10-CM

## 2024-07-15 DIAGNOSIS — M35.2 BEHCET'S DISEASE (MULTI): ICD-10-CM

## 2024-07-15 LAB
ERYTHROCYTE [DISTWIDTH] IN BLOOD BY AUTOMATED COUNT: 13.2 % (ref 11.5–14.5)
HCT VFR BLD AUTO: 37.6 % (ref 36–46)
HGB BLD-MCNC: 12.5 G/DL (ref 12–16)
MCH RBC QN AUTO: 30.6 PG (ref 26–34)
MCHC RBC AUTO-ENTMCNC: 33.2 G/DL (ref 32–36)
MCV RBC AUTO: 92 FL (ref 80–100)
NRBC BLD-RTO: 0 /100 WBCS (ref 0–0)
PLATELET # BLD AUTO: 249 X10*3/UL (ref 150–450)
PREGNANCY TEST URINE, POC: POSITIVE
RBC # BLD AUTO: 4.09 X10*6/UL (ref 4–5.2)
REFLEX ADDED, ANEMIA PANEL: NORMAL
WBC # BLD AUTO: 8.1 X10*3/UL (ref 4.4–11.3)

## 2024-07-15 PROCEDURE — 36415 COLL VENOUS BLD VENIPUNCTURE: CPT

## 2024-07-15 PROCEDURE — 0500F INITIAL PRENATAL CARE VISIT: CPT | Performed by: OBSTETRICS & GYNECOLOGY

## 2024-07-15 PROCEDURE — 76813 OB US NUCHAL MEAS 1 GEST: CPT

## 2024-07-15 PROCEDURE — 85027 COMPLETE CBC AUTOMATED: CPT

## 2024-07-15 PROCEDURE — 81025 URINE PREGNANCY TEST: CPT | Performed by: OBSTETRICS & GYNECOLOGY

## 2024-07-15 PROCEDURE — 76813 OB US NUCHAL MEAS 1 GEST: CPT | Performed by: OBSTETRICS & GYNECOLOGY

## 2024-07-15 RX ORDER — PNV NO.95/FERROUS FUM/FOLIC AC 28MG-0.8MG
TABLET ORAL
COMMUNITY

## 2024-07-15 ASSESSMENT — PAIN SCALES - GENERAL: PAINLEVEL_OUTOF10: 0 - NO PAIN

## 2024-07-15 ASSESSMENT — EDINBURGH POSTNATAL DEPRESSION SCALE (EPDS)
THINGS HAVE BEEN GETTING ON TOP OF ME: NO, MOST OF THE TIME I HAVE COPED QUITE WELL
I HAVE BEEN SO UNHAPPY THAT I HAVE BEEN CRYING: NO, NEVER
I HAVE LOOKED FORWARD WITH ENJOYMENT TO THINGS: AS MUCH AS I EVER DID
I HAVE FELT SAD OR MISERABLE: NO, NOT AT ALL
TOTAL SCORE: 1
I HAVE FELT SCARED OR PANICKY FOR NO GOOD REASON: NO, NOT AT ALL
THE THOUGHT OF HARMING MYSELF HAS OCCURRED TO ME: NEVER
I HAVE BEEN ABLE TO LAUGH AND SEE THE FUNNY SIDE OF THINGS: AS MUCH AS I ALWAYS COULD
I HAVE BEEN SO UNHAPPY THAT I HAVE HAD DIFFICULTY SLEEPING: NOT AT ALL
I HAVE BEEN ANXIOUS OR WORRIED FOR NO GOOD REASON: NO, NOT AT ALL
I HAVE BLAMED MYSELF UNNECESSARILY WHEN THINGS WENT WRONG: NO, NEVER

## 2024-07-15 ASSESSMENT — PAIN - FUNCTIONAL ASSESSMENT: PAIN_FUNCTIONAL_ASSESSMENT: 0-10

## 2024-07-15 NOTE — PROGRESS NOTES
S/p carrier screening first with Jihan and me, then confirmed via another third party genetics company prior to first pregnancy      plan:  1)  prenatal labs discussed.  Had many drawn right before conception during eval with SHIRA.    2)  discussed aneuploidy screening.  Desires.  Order for cfDNA given.  Up to date on carrier screening.  Discussed NT.  Will schedule this week.    3)  will plan for bASA 162 mg daily.    Problem List Items Addressed This Visit          Pregnancy    Family history of first degree relative with congenital heart disease    Overview     FOB Brother with history of dextrocardia, TOGV  S/p fetal ECHO in P1, will plan to obtain this pregnancy         Heterozygous factor V Leiden affecting pregnancy in first trimester, antepartum (Multi)    Overview     No personal history of DVT/PE  Father passed away from PRESUMED massive PE  In prior pregnancy, surveillance only         Underweight    Overview     Prepreg BMI 18            Other    Behcet's disease (Multi)    Overview     Generalized arthralgias  Usually ulcers, oral and vaginal (infrequent recurrence)          Other Visit Diagnoses       13 weeks gestation of pregnancy ([facility])    -  Primary    Relevant Orders    [facility] Prequel Prenatal Screen    CBC Anemia Panel With Reflex, Pregnancy (Completed)    US MAC OB imaging order    POCT pregnancy, urine manually resulted (Completed)

## 2024-07-16 PROBLEM — Z82.79 FAMILY HISTORY OF FIRST DEGREE RELATIVE WITH CONGENITAL HEART DISEASE: Status: ACTIVE | Noted: 2024-07-16

## 2024-07-16 PROBLEM — D68.51 HETEROZYGOUS FACTOR V LEIDEN AFFECTING PREGNANCY IN FIRST TRIMESTER, ANTEPARTUM (MULTI): Status: ACTIVE | Noted: 2024-07-16

## 2024-07-16 PROBLEM — O99.111 HETEROZYGOUS FACTOR V LEIDEN AFFECTING PREGNANCY IN FIRST TRIMESTER, ANTEPARTUM (MULTI): Status: ACTIVE | Noted: 2024-07-16

## 2024-07-16 PROBLEM — R63.6 UNDERWEIGHT: Status: ACTIVE | Noted: 2024-07-16

## 2024-07-16 PROBLEM — M35.2 BEHCET'S DISEASE (MULTI): Status: ACTIVE | Noted: 2024-07-16

## 2024-07-23 LAB — SCAN RESULT: NORMAL

## 2024-08-05 PROCEDURE — RXMED WILLOW AMBULATORY MEDICATION CHARGE

## 2024-08-10 ENCOUNTER — PHARMACY VISIT (OUTPATIENT)
Dept: PHARMACY | Facility: CLINIC | Age: 34
End: 2024-08-10
Payer: COMMERCIAL

## 2024-08-27 ENCOUNTER — APPOINTMENT (OUTPATIENT)
Dept: RADIOLOGY | Facility: HOSPITAL | Age: 34
End: 2024-08-27
Payer: COMMERCIAL

## 2024-08-29 ENCOUNTER — HOSPITAL ENCOUNTER (OUTPATIENT)
Dept: RADIOLOGY | Facility: HOSPITAL | Age: 34
Discharge: HOME | End: 2024-08-29
Payer: COMMERCIAL

## 2024-08-29 DIAGNOSIS — Z3A.13 13 WEEKS GESTATION OF PREGNANCY (HHS-HCC): ICD-10-CM

## 2024-08-29 PROCEDURE — 76811 OB US DETAILED SNGL FETUS: CPT | Performed by: MEDICAL GENETICS

## 2024-08-29 PROCEDURE — 76811 OB US DETAILED SNGL FETUS: CPT

## 2024-08-30 ENCOUNTER — ROUTINE PRENATAL (OUTPATIENT)
Dept: OBSTETRICS AND GYNECOLOGY | Facility: CLINIC | Age: 34
End: 2024-08-30
Payer: COMMERCIAL

## 2024-08-30 VITALS
DIASTOLIC BLOOD PRESSURE: 66 MMHG | BODY MASS INDEX: 19.22 KG/M2 | SYSTOLIC BLOOD PRESSURE: 119 MMHG | HEART RATE: 82 BPM | WEIGHT: 126.44 LBS

## 2024-08-30 DIAGNOSIS — Z3A.20 20 WEEKS GESTATION OF PREGNANCY (HHS-HCC): ICD-10-CM

## 2024-08-30 DIAGNOSIS — Z34.92 PRENATAL CARE, SECOND TRIMESTER (HHS-HCC): ICD-10-CM

## 2024-08-30 DIAGNOSIS — M53.3 SACROILIAC JOINT PAIN: Primary | ICD-10-CM

## 2024-08-30 PROCEDURE — 0501F PRENATAL FLOW SHEET: CPT | Performed by: OBSTETRICS & GYNECOLOGY

## 2024-08-30 ASSESSMENT — PAIN - FUNCTIONAL ASSESSMENT: PAIN_FUNCTIONAL_ASSESSMENT: 0-10

## 2024-08-30 ASSESSMENT — PAIN SCALES - GENERAL: PAINLEVEL_OUTOF10: 0 - NO PAIN

## 2024-09-17 ENCOUNTER — HOSPITAL ENCOUNTER (OUTPATIENT)
Dept: PEDIATRIC CARDIOLOGY | Facility: HOSPITAL | Age: 34
Discharge: HOME | End: 2024-09-17
Payer: COMMERCIAL

## 2024-09-17 ENCOUNTER — OFFICE VISIT (OUTPATIENT)
Dept: PEDIATRIC CARDIOLOGY | Facility: HOSPITAL | Age: 34
End: 2024-09-17
Payer: COMMERCIAL

## 2024-09-17 VITALS
SYSTOLIC BLOOD PRESSURE: 114 MMHG | HEART RATE: 77 BPM | HEIGHT: 68 IN | WEIGHT: 129.41 LBS | BODY MASS INDEX: 19.61 KG/M2 | OXYGEN SATURATION: 100 % | DIASTOLIC BLOOD PRESSURE: 66 MMHG

## 2024-09-17 DIAGNOSIS — O35.BXX1: Primary | ICD-10-CM

## 2024-09-17 DIAGNOSIS — O28.3 ABNORMAL ULTRASONIC FINDING ON ANTENATAL SCREENING OF MOTHER: ICD-10-CM

## 2024-09-17 DIAGNOSIS — Z82.79 FAMILY HISTORY OF CONGENITAL HEART DISEASE: ICD-10-CM

## 2024-09-17 PROCEDURE — 93325 DOPPLER ECHO COLOR FLOW MAPG: CPT | Performed by: PEDIATRICS

## 2024-09-17 PROCEDURE — 99215 OFFICE O/P EST HI 40 MIN: CPT | Performed by: PEDIATRICS

## 2024-09-17 PROCEDURE — 76825 ECHO EXAM OF FETAL HEART: CPT

## 2024-09-17 PROCEDURE — 3008F BODY MASS INDEX DOCD: CPT | Performed by: PEDIATRICS

## 2024-09-17 PROCEDURE — 76825 ECHO EXAM OF FETAL HEART: CPT | Performed by: PEDIATRICS

## 2024-09-17 PROCEDURE — 76827 ECHO EXAM OF FETAL HEART: CPT | Performed by: PEDIATRICS

## 2024-09-17 NOTE — PROGRESS NOTES
[facility]   [facility]  Division of Pediatric Cardiology     Obstetrician: Dr. Zulay Butcher     Irma Dobson was seen at the request of Dr. Rimma Manning for a family history of congenital heart disease.  Records were reviewed, and a summary of those records is integrated within the history of present illness.  A report with my findings is being sent via written or electronic means to the referring physician with my recommendations    History obtained from:  Self    History of Presentation   History of Present Illness:   Imra Dobson is a 33 y.o. female presenting for initial prenatal cardiology consultation and fetal echocardiogram for a family history of congenital heart disease.  She is  and approximately 22 4/7 weeks pregnant (Patient's last menstrual period was 2024 (exact date)., Estimated Date of Delivery: 25) with a female fetus.  Her obstetric history is significant for one term vaginal delivery.  Complications of her current pregnancy include Heterozygous factor V Leiden and a history of Behcet's disease.  Ms. Irma Dobson had a normal first trimester screen.  Her level 2 obstetric ultrasound for anatomy was performed at 20 weeks gestational age and was normal.  She has undergone cell free fetal DNA testing and it was normal.  She has not had invasive genetic testing such as amniocentesis or chorionic villous sampling.  This pregnancy was not the result of in vitro fertilization.  She was not using potentially teratogenic medication at the time of conception.  There have been no other complications of this pregnancy.  Ms. Irma Dobsno plans to deliver her baby at [facility].    Ms. Irma Dobson feels well today.  She denies any shortness of breath, abdominal cramping, contractions, bleeding, or swelling of the extremities.  She notes frequent fetal movement.        Medical History  "    Medical Conditions:  Patient Active Problem List   Diagnosis    Heterozygous factor V Leiden affecting pregnancy in first trimester, antepartum (Multi)    Behcet's disease (Multi)    Family history of first degree relative with congenital heart disease    Underweight     Past Surgeries:  Past Surgical History:   Procedure Laterality Date    APPENDECTOMY  08/11/2017    Appendectomy       Current Outpatient Medications   Medication Instructions    ondansetron ODT (Zofran-ODT) 4 mg disintegrating tablet Dissolve 1 tablet in mouth every 6 hours as needed for nausea.    prenatal vit no.124-iron-folic (Prenatal Vitamin) 27 mg iron- 800 mcg tablet oral      Allergies:  Patient has no known allergies.    Social History:  Patient lives with  partner and child  .    Occupation: Emergency physician   Second hand smoke exposure: None  Smoking: None  Alcohol: None  Drug Use: None  She wears a seatbelt while in the car. She denies any verbal, sexual or physical abuse.     Cardiac Family History (for patient and father of baby):  Father of baby's brother with dextrocardia and congenitally corrected transposition. There is no history of early sudden/unexplained death including SIDS and drowning.  There is no history of cardiomyopathy of any type or heart transplant.  There is no history of arrhythmias/pacemaker/defibrillator or arrhythmia syndromes, including Long QT syndrome, Juan Diego-Parkinson-White syndrome or Brugada syndrome.  There is no history of heart attack or stroke before the age of 55 years in a close family member.  There is no history of Marfan syndrome or aortic aneurysm.  There is no history of deafness.  There is no history of syncope/fainting.  There is no history of high blood pressure or high cholesterol.  There is no history of DiGeorge Syndrome (22q11).    Physical Examination     /66 (BP Location: Right arm, Patient Position: Sitting, BP Cuff Size: Adult)   Pulse 77   Ht 1.73 m (5' 8.11\")   Wt 58.7 " kg (129 lb 6.6 oz)   LMP 2024 (Exact Date)   SpO2 100%   BMI 19.61 kg/m²     General: Alert, well-appearing and in no acute distress.    Abdomen: Soft, nontender, not distended. Gravid.  Extremities: No swelling or edema.  Neurologic / Psychiatric: Grossly intact without focal deficits.  Appropriate demeanor.      Results     Fetal Echocardiogram performed at 22w4d weeks of gestation :    I ordered and interpreted a transabdominal fetal echocardiogram.  I performed a portion of the study myself.  The complete report is available under separate cover.  The results are summarized as follows:     1. Normal cardiac segmental anatomy.   2. Qualitatively normal right ventricular size and systolic function.   3. Qualitatively normal left ventricular size and systolic function.   4. There is no significant pericardial effusion.    Assessment & Plan   Assessment:  Ms. Irma Dobson is a 33 y.o. female who is  and currently at 22 4/7 weeks gestational age with a female fetus.  A fetal echocardiogram was performed today for family history of congenital heart disease.     In summary, the fetal echocardiogram today demonstrated grossly normal fetal cardiac segmental anatomy, qualitatively normal fetal cardiac systolic function, normal fetal heart rhythm, and no evidence of in utero congestive heart failure or hydrops fetalis.  I reviewed the results of today's evaluation, including the findings of the fetal echocardiogram, with Ms. Irma Dobson in detail.      I discussed limitations of the technology of fetal echocardiography with Ms. Irma Dobson in detail.  I explained that fetal echocardiography cannot exclude all forms of congenital heart disease.  Fetal echocardiography may be insensitive to some defects of atrial and ventricular septation, minor valvular abnormalities, partial anomalous pulmonary venous return, and coarctation of the aorta.  In addition, normal fetal echocardiogram does not ensure  that the fetal ductus arteriosus or foramen ovale will close.      Recommendations:  No changes to prenatal care.    Further fetal cardiac imaging is not required at this time.  We would be happy to see Ms. Irma Dobson in the future if new concerns arise.    Triage code 0:        No changes to delivery planning.  Delivery per obstetrics at patient´s preferred hospital.  Standard  care per  team.        No pediatric cardiology consult needed after birth unless there are concerns/issues.    I spent 90 minutes in this encounter including time spent on history taking, examination, review of diagnostic studies, counselling and documentation.    This assessment and plan, in addition to the results of relevant testing were explained to Irma. All questions were answered and understanding was demonstrated.     It was a pleasure to see Ms. Irma Dobson today.  If you have any questions or concerns regarding this evaluation, do not hesitate to contact me.      Mannie Celis MD  Division of Pediatric Cardiology  [place] [id number]  Phone: [phone]  Fax: [phone]

## 2024-09-18 PROBLEM — O35.BXX1: Status: ACTIVE | Noted: 2024-09-18

## 2024-09-23 ENCOUNTER — APPOINTMENT (OUTPATIENT)
Dept: OBSTETRICS AND GYNECOLOGY | Facility: CLINIC | Age: 34
End: 2024-09-23
Payer: COMMERCIAL

## 2024-09-25 ENCOUNTER — ROUTINE PRENATAL (OUTPATIENT)
Dept: OBSTETRICS AND GYNECOLOGY | Facility: CLINIC | Age: 34
End: 2024-09-25
Payer: COMMERCIAL

## 2024-09-25 VITALS
HEART RATE: 76 BPM | DIASTOLIC BLOOD PRESSURE: 62 MMHG | WEIGHT: 129.4 LBS | SYSTOLIC BLOOD PRESSURE: 103 MMHG | BODY MASS INDEX: 19.61 KG/M2

## 2024-09-25 DIAGNOSIS — Z3A.24 24 WEEKS GESTATION OF PREGNANCY (HHS-HCC): Primary | ICD-10-CM

## 2024-09-25 DIAGNOSIS — Z34.92 PRENATAL CARE, SECOND TRIMESTER (HHS-HCC): ICD-10-CM

## 2024-09-25 PROCEDURE — 0501F PRENATAL FLOW SHEET: CPT | Performed by: OBSTETRICS & GYNECOLOGY

## 2024-09-25 ASSESSMENT — ENCOUNTER SYMPTOMS
ENDOCRINE NEGATIVE: 0
CONSTITUTIONAL NEGATIVE: 0
GASTROINTESTINAL NEGATIVE: 0
HEMATOLOGIC/LYMPHATIC NEGATIVE: 0
NEUROLOGICAL NEGATIVE: 0
RESPIRATORY NEGATIVE: 0
ALLERGIC/IMMUNOLOGIC NEGATIVE: 0
MUSCULOSKELETAL NEGATIVE: 0
PSYCHIATRIC NEGATIVE: 0
EYES NEGATIVE: 0
CARDIOVASCULAR NEGATIVE: 0

## 2024-09-25 ASSESSMENT — PAIN SCALES - GENERAL: PAINLEVEL_OUTOF10: 0 - NO PAIN

## 2024-09-25 ASSESSMENT — PAIN - FUNCTIONAL ASSESSMENT: PAIN_FUNCTIONAL_ASSESSMENT: 0-10

## 2024-10-03 NOTE — PROGRESS NOTES
S/p normal fetal ECHO.    Will plan for 24-28 wks labs.    Got flu vax through work.    Precautions discussed.

## 2024-10-04 ENCOUNTER — LAB (OUTPATIENT)
Dept: LAB | Facility: LAB | Age: 34
End: 2024-10-04
Payer: COMMERCIAL

## 2024-10-04 ENCOUNTER — OFFICE VISIT (OUTPATIENT)
Dept: OPHTHALMOLOGY | Facility: CLINIC | Age: 34
End: 2024-10-04
Payer: COMMERCIAL

## 2024-10-04 DIAGNOSIS — M35.2 BEHCET'S DISEASE (MULTI): ICD-10-CM

## 2024-10-04 DIAGNOSIS — H52.13 MYOPIA OF BOTH EYES: Primary | ICD-10-CM

## 2024-10-04 DIAGNOSIS — Z3A.24 24 WEEKS GESTATION OF PREGNANCY (HHS-HCC): ICD-10-CM

## 2024-10-04 LAB
ERYTHROCYTE [DISTWIDTH] IN BLOOD BY AUTOMATED COUNT: 13.8 % (ref 11.5–14.5)
GLUCOSE 1H P 50 G GLC PO SERPL-MCNC: 85 MG/DL
HCT VFR BLD AUTO: 34.8 % (ref 36–46)
HGB BLD-MCNC: 11.4 G/DL (ref 12–16)
MCH RBC QN AUTO: 31.6 PG (ref 26–34)
MCHC RBC AUTO-ENTMCNC: 32.8 G/DL (ref 32–36)
MCV RBC AUTO: 96 FL (ref 80–100)
NRBC BLD-RTO: 0 /100 WBCS (ref 0–0)
PLATELET # BLD AUTO: 252 X10*3/UL (ref 150–450)
RBC # BLD AUTO: 3.61 X10*6/UL (ref 4–5.2)
REFLEX ADDED, ANEMIA PANEL: NORMAL
TREPONEMA PALLIDUM IGG+IGM AB [PRESENCE] IN SERUM OR PLASMA BY IMMUNOASSAY: NONREACTIVE
WBC # BLD AUTO: 7.8 X10*3/UL (ref 4.4–11.3)

## 2024-10-04 PROCEDURE — 86780 TREPONEMA PALLIDUM: CPT

## 2024-10-04 PROCEDURE — 85027 COMPLETE CBC AUTOMATED: CPT

## 2024-10-04 PROCEDURE — 82947 ASSAY GLUCOSE BLOOD QUANT: CPT

## 2024-10-04 ASSESSMENT — REFRACTION_MANIFEST
OD_SPHERE: -1.50
OS_SPHERE: -2.25
OS_CYLINDER: SPHERE
OD_CYLINDER: SPHERE

## 2024-10-04 ASSESSMENT — REFRACTION_CURRENTRX
OS_DIAMETER: 14.0
OD_CYLINDER: SPHERE
OD_BASECURVE: 8.4
OD_DIAMETER: 14.0
OD_BRAND: ACUVUE OASYS
OS_BASECURVE: 8.4
OS_CYLINDER: SPHERE
OS_SPHERE: -2.25
OD_SPHERE: -1.75
OS_BRAND: ACUVUE OASYS

## 2024-10-04 ASSESSMENT — CONF VISUAL FIELD
OS_INFERIOR_TEMPORAL_RESTRICTION: 0
METHOD: COUNTING FINGERS
OS_NORMAL: 1
OS_SUPERIOR_NASAL_RESTRICTION: 0
OS_INFERIOR_NASAL_RESTRICTION: 0
OD_INFERIOR_TEMPORAL_RESTRICTION: 0
OS_SUPERIOR_TEMPORAL_RESTRICTION: 0
OD_SUPERIOR_TEMPORAL_RESTRICTION: 0
OD_NORMAL: 1
OD_INFERIOR_NASAL_RESTRICTION: 0
OD_SUPERIOR_NASAL_RESTRICTION: 0

## 2024-10-04 ASSESSMENT — EXTERNAL EXAM - LEFT EYE: OS_EXAM: NORMAL

## 2024-10-04 ASSESSMENT — ENCOUNTER SYMPTOMS
PSYCHIATRIC NEGATIVE: 0
HEMATOLOGIC/LYMPHATIC NEGATIVE: 0
CONSTITUTIONAL NEGATIVE: 0
RESPIRATORY NEGATIVE: 0
MUSCULOSKELETAL NEGATIVE: 0
EYES NEGATIVE: 0
ALLERGIC/IMMUNOLOGIC NEGATIVE: 0
CARDIOVASCULAR NEGATIVE: 0
ENDOCRINE NEGATIVE: 0
NEUROLOGICAL NEGATIVE: 0
GASTROINTESTINAL NEGATIVE: 0

## 2024-10-04 ASSESSMENT — TONOMETRY
OS_IOP_MMHG: 16
IOP_METHOD: GOLDMANN APPLANATION
OD_IOP_MMHG: 16

## 2024-10-04 ASSESSMENT — VISUAL ACUITY
OS_CC: 20/20
METHOD: SNELLEN - LINEAR
OD_CC: 20/20
VA_OR_OD_CURRENT_RX: 20/20
VA_OR_OS_CURRENT_RX: 20/20
CORRECTION_TYPE: CONTACTS

## 2024-10-04 ASSESSMENT — REFRACTION_WEARINGRX
OS_SPHERE: -2.25
OD_SPHERE: -1.75
OD_CYLINDER: SPHERE
OS_CYLINDER: SPHERE

## 2024-10-04 ASSESSMENT — SLIT LAMP EXAM - LIDS
COMMENTS: GOOD POSITION
COMMENTS: GOOD POSITION

## 2024-10-04 ASSESSMENT — CUP TO DISC RATIO
OD_RATIO: .35, MALINSERTED
OS_RATIO: .35, MALINSERTED

## 2024-10-04 ASSESSMENT — EXTERNAL EXAM - RIGHT EYE: OD_EXAM: NORMAL

## 2024-10-04 NOTE — Clinical Note
Both eyes (OU) Contact Lens Order  Quantity: 1 Package: 12 PK Appointment needed? No Medically necessary? No Ship To: Home Additional instructions: mail 12 pack for each eye to home

## 2024-10-04 NOTE — PROGRESS NOTES
Assessment/Plan   Diagnoses and all orders for this visit:  Myopia of both eyes  Behcet's disease (Multi)    A spectacle prescription was dispensed to be used as needed.  A contact lens prescription was dispensed at the patient's request.    Order 6 month supply Oasys 2 week disposable CL  Not interested in Daily Disposable  Uses peroxide cleaning solutions- compliant    Mild RX change (over-correction) but did not prescribe new RX due to pregnancy    Recheck next year and reduce if consistent

## 2024-10-28 ENCOUNTER — ROUTINE PRENATAL (OUTPATIENT)
Dept: OBSTETRICS AND GYNECOLOGY | Facility: CLINIC | Age: 34
End: 2024-10-28
Payer: COMMERCIAL

## 2024-10-28 ENCOUNTER — EVALUATION (OUTPATIENT)
Dept: PHYSICAL THERAPY | Facility: CLINIC | Age: 34
End: 2024-10-28
Payer: COMMERCIAL

## 2024-10-28 VITALS
BODY MASS INDEX: 20.57 KG/M2 | DIASTOLIC BLOOD PRESSURE: 70 MMHG | HEART RATE: 103 BPM | WEIGHT: 135.7 LBS | SYSTOLIC BLOOD PRESSURE: 111 MMHG

## 2024-10-28 DIAGNOSIS — Z3A.28 28 WEEKS GESTATION OF PREGNANCY (HHS-HCC): ICD-10-CM

## 2024-10-28 DIAGNOSIS — M53.3 SACROILIAC JOINT PAIN: ICD-10-CM

## 2024-10-28 DIAGNOSIS — Z23 NEED FOR TDAP VACCINATION: ICD-10-CM

## 2024-10-28 DIAGNOSIS — Z71.85 VACCINE COUNSELING: ICD-10-CM

## 2024-10-28 DIAGNOSIS — I86.3 VARICOSE VEINS OF VULVA AND PERINEUM: Primary | ICD-10-CM

## 2024-10-28 PROCEDURE — 97110 THERAPEUTIC EXERCISES: CPT | Mod: GP | Performed by: PHYSICAL THERAPIST

## 2024-10-28 PROCEDURE — 0501F PRENATAL FLOW SHEET: CPT | Performed by: OBSTETRICS & GYNECOLOGY

## 2024-10-28 PROCEDURE — 97161 PT EVAL LOW COMPLEX 20 MIN: CPT | Mod: GP | Performed by: PHYSICAL THERAPIST

## 2024-10-28 PROCEDURE — 90715 TDAP VACCINE 7 YRS/> IM: CPT | Performed by: OBSTETRICS & GYNECOLOGY

## 2024-10-28 ASSESSMENT — ENCOUNTER SYMPTOMS
OCCASIONAL FEELINGS OF UNSTEADINESS: 0
MUSCULOSKELETAL NEGATIVE: 0
ENDOCRINE NEGATIVE: 0
ALLERGIC/IMMUNOLOGIC NEGATIVE: 0
NEUROLOGICAL NEGATIVE: 0
CARDIOVASCULAR NEGATIVE: 0
HEMATOLOGIC/LYMPHATIC NEGATIVE: 0
DEPRESSION: 0
EYES NEGATIVE: 0
RESPIRATORY NEGATIVE: 0
PSYCHIATRIC NEGATIVE: 0
LOSS OF SENSATION IN FEET: 0
CONSTITUTIONAL NEGATIVE: 0
GASTROINTESTINAL NEGATIVE: 0

## 2024-10-28 ASSESSMENT — PATIENT HEALTH QUESTIONNAIRE - PHQ9
1. LITTLE INTEREST OR PLEASURE IN DOING THINGS: NOT AT ALL
SUM OF ALL RESPONSES TO PHQ9 QUESTIONS 1 AND 2: 0
2. FEELING DOWN, DEPRESSED OR HOPELESS: NOT AT ALL

## 2024-10-28 ASSESSMENT — PAIN SCALES - GENERAL: PAINLEVEL_OUTOF10: 0 - NO PAIN

## 2024-10-28 ASSESSMENT — PAIN - FUNCTIONAL ASSESSMENT: PAIN_FUNCTIONAL_ASSESSMENT: 0-10

## 2024-11-11 ENCOUNTER — ROUTINE PRENATAL (OUTPATIENT)
Dept: OBSTETRICS AND GYNECOLOGY | Facility: CLINIC | Age: 34
End: 2024-11-11
Payer: COMMERCIAL

## 2024-11-11 VITALS
HEART RATE: 74 BPM | SYSTOLIC BLOOD PRESSURE: 109 MMHG | BODY MASS INDEX: 20.81 KG/M2 | DIASTOLIC BLOOD PRESSURE: 66 MMHG | WEIGHT: 137.3 LBS

## 2024-11-11 DIAGNOSIS — Z3A.30 30 WEEKS GESTATION OF PREGNANCY (HHS-HCC): Primary | ICD-10-CM

## 2024-11-11 DIAGNOSIS — Z34.93 PRENATAL CARE, THIRD TRIMESTER (HHS-HCC): ICD-10-CM

## 2024-11-11 PROBLEM — O99.113: Status: ACTIVE | Noted: 2024-07-16

## 2024-11-11 PROCEDURE — 0501F PRENATAL FLOW SHEET: CPT | Performed by: OBSTETRICS & GYNECOLOGY

## 2024-11-11 RX ORDER — ASPIRIN 81 MG/1
81 TABLET ORAL DAILY
COMMUNITY

## 2024-11-11 ASSESSMENT — PAIN SCALES - GENERAL: PAINLEVEL_OUTOF10: 0 - NO PAIN

## 2024-11-18 ENCOUNTER — APPOINTMENT (OUTPATIENT)
Dept: PHYSICAL THERAPY | Facility: CLINIC | Age: 34
End: 2024-11-18
Payer: COMMERCIAL

## 2024-11-25 ENCOUNTER — PROCEDURE VISIT (OUTPATIENT)
Dept: OBSTETRICS AND GYNECOLOGY | Facility: CLINIC | Age: 34
End: 2024-11-25
Payer: COMMERCIAL

## 2024-11-25 ENCOUNTER — ROUTINE PRENATAL (OUTPATIENT)
Dept: OBSTETRICS AND GYNECOLOGY | Facility: CLINIC | Age: 34
End: 2024-11-25
Payer: COMMERCIAL

## 2024-11-25 VITALS
WEIGHT: 140.7 LBS | HEART RATE: 76 BPM | DIASTOLIC BLOOD PRESSURE: 64 MMHG | BODY MASS INDEX: 21.32 KG/M2 | SYSTOLIC BLOOD PRESSURE: 106 MMHG

## 2024-11-25 DIAGNOSIS — Z3A.32 32 WEEKS GESTATION OF PREGNANCY (HHS-HCC): ICD-10-CM

## 2024-11-25 DIAGNOSIS — O32.9XX0 MALPRESENTATION BEFORE ONSET OF LABOR, SINGLE OR UNSPECIFIED FETUS (HHS-HCC): ICD-10-CM

## 2024-11-25 DIAGNOSIS — O36.8390: ICD-10-CM

## 2024-11-25 DIAGNOSIS — Z29.11 NEED FOR RSV IMMUNIZATION: Primary | ICD-10-CM

## 2024-11-25 PROCEDURE — 90471 IMMUNIZATION ADMIN: CPT | Performed by: OBSTETRICS & GYNECOLOGY

## 2024-11-25 PROCEDURE — 59025 FETAL NON-STRESS TEST: CPT | Performed by: OBSTETRICS & GYNECOLOGY

## 2024-11-25 PROCEDURE — 0501F PRENATAL FLOW SHEET: CPT | Performed by: OBSTETRICS & GYNECOLOGY

## 2024-11-25 ASSESSMENT — ENCOUNTER SYMPTOMS
GASTROINTESTINAL NEGATIVE: 0
EYES NEGATIVE: 0
ENDOCRINE NEGATIVE: 0
CONSTITUTIONAL NEGATIVE: 0
ALLERGIC/IMMUNOLOGIC NEGATIVE: 0
NEUROLOGICAL NEGATIVE: 0
CARDIOVASCULAR NEGATIVE: 0
MUSCULOSKELETAL NEGATIVE: 0
RESPIRATORY NEGATIVE: 0
HEMATOLOGIC/LYMPHATIC NEGATIVE: 0
PSYCHIATRIC NEGATIVE: 0

## 2024-11-25 ASSESSMENT — PATIENT HEALTH QUESTIONNAIRE - PHQ9
2. FEELING DOWN, DEPRESSED OR HOPELESS: NOT AT ALL
1. LITTLE INTEREST OR PLEASURE IN DOING THINGS: NOT AT ALL
SUM OF ALL RESPONSES TO PHQ9 QUESTIONS 1 AND 2: 0

## 2024-11-25 ASSESSMENT — PAIN - FUNCTIONAL ASSESSMENT: PAIN_FUNCTIONAL_ASSESSMENT: 0-10

## 2024-11-25 ASSESSMENT — PAIN SCALES - GENERAL: PAINLEVEL_OUTOF10: 0 - NO PAIN

## 2024-11-25 NOTE — PROGRESS NOTES
OB Follow-up  2024           ASSESSMENT & PLAN    Irma Dobson is a 34 y.o.  at 32w3d here for the following concerns we addressed today:    Assessment & Plan  Need for RSV immunization    Orders:    Respiratory Syncytial Virus (RSV), Eligible Pregnant Pts, 0.5 mL (ABRYSVO)    Non-reassuring electronic fetal monitoring tracing ([facility])  Audible decels on FHR doppler, NST obtained to determine pattern  Orders:    Fetal nonstress test; Future    32 weeks gestation of pregnancy ([facility])    Orders:    Fetal nonstress test; Future    Malpresentation before onset of labor, single or unspecified fetus ([facility])  Trying inversions, discussed moxibustion  Will continue to monitor, discuss ECV @ 37 wks               RTC in 2-3 weeks    Zulay Butcher MD     SUBJECTIVE    HPI: Irma Dobson is a 34 y.o.  at 32w3d here for RPNV.   Denies contractions, bleeding, or LOF. Reports normal fetal movement.     States that fetus flipped to breech last week - checked on usn @ work  Has been trying some inversions      Non-Stress Test   Baseline Fetal Heart Rate for Non-Stress Test: 115 BPM  Variability in Waveform for Non-Stress Test: Moderate  Accelerations in Non-Stress Test: Yes, greater than/equal to 15 bpm, lasting at least 15 seconds  Decelerations in Non-Stress Test:  (drop to 100 briefly)  Contractions in Non-Stress Test: Not present  Interpretation of Non-Stress Test   Interpretation of Non-Stress Test: Reactive

## 2024-11-25 NOTE — PROGRESS NOTES
RSV, vaccine per (provider)  Given IM into right deltoid  Supplied by office  Patient tolerated well  VIS given     LOT #: [id number]  Expiration date: 9/30/25

## 2024-12-09 ENCOUNTER — APPOINTMENT (OUTPATIENT)
Dept: OBSTETRICS AND GYNECOLOGY | Facility: CLINIC | Age: 34
End: 2024-12-09
Payer: COMMERCIAL

## 2024-12-11 ENCOUNTER — HOSPITAL ENCOUNTER (OUTPATIENT)
Facility: HOSPITAL | Age: 34
Discharge: HOME | End: 2024-12-11
Attending: OBSTETRICS & GYNECOLOGY | Admitting: OBSTETRICS & GYNECOLOGY
Payer: COMMERCIAL

## 2024-12-11 ENCOUNTER — HOSPITAL ENCOUNTER (OUTPATIENT)
Facility: HOSPITAL | Age: 34
End: 2024-12-11
Attending: OBSTETRICS & GYNECOLOGY | Admitting: OBSTETRICS & GYNECOLOGY
Payer: COMMERCIAL

## 2024-12-11 VITALS
HEART RATE: 69 BPM | SYSTOLIC BLOOD PRESSURE: 113 MMHG | OXYGEN SATURATION: 99 % | RESPIRATION RATE: 18 BRPM | TEMPERATURE: 97.5 F | DIASTOLIC BLOOD PRESSURE: 59 MMHG | WEIGHT: 144.62 LBS | BODY MASS INDEX: 21.92 KG/M2 | HEIGHT: 68 IN

## 2024-12-11 LAB
FLUAV RNA RESP QL NAA+PROBE: DETECTED
FLUBV RNA RESP QL NAA+PROBE: NOT DETECTED
SARS-COV-2 RNA RESP QL NAA+PROBE: NOT DETECTED

## 2024-12-11 PROCEDURE — 87636 SARSCOV2 & INF A&B AMP PRB: CPT

## 2024-12-11 PROCEDURE — 59025 FETAL NON-STRESS TEST: CPT

## 2024-12-11 PROCEDURE — 99213 OFFICE O/P EST LOW 20 MIN: CPT

## 2024-12-11 PROCEDURE — 99215 OFFICE O/P EST HI 40 MIN: CPT | Mod: 25

## 2024-12-11 SDOH — SOCIAL STABILITY: SOCIAL INSECURITY: VERBAL ABUSE: DENIES

## 2024-12-11 SDOH — HEALTH STABILITY: MENTAL HEALTH: HAVE YOU USED ANY PRESCRIPTION DRUGS OTHER THAN PRESCRIBED IN THE PAST 12 MONTHS?: NO

## 2024-12-11 SDOH — HEALTH STABILITY: MENTAL HEALTH: WERE YOU ABLE TO COMPLETE ALL THE BEHAVIORAL HEALTH SCREENINGS?: YES

## 2024-12-11 SDOH — SOCIAL STABILITY: SOCIAL INSECURITY: ARE THERE ANY APPARENT SIGNS OF INJURIES/BEHAVIORS THAT COULD BE RELATED TO ABUSE/NEGLECT?: NO

## 2024-12-11 SDOH — SOCIAL STABILITY: SOCIAL INSECURITY: ABUSE SCREEN: ADULT

## 2024-12-11 SDOH — SOCIAL STABILITY: SOCIAL INSECURITY: PHYSICAL ABUSE: DENIES

## 2024-12-11 SDOH — SOCIAL STABILITY: SOCIAL INSECURITY: DO YOU FEEL ANYONE HAS EXPLOITED OR TAKEN ADVANTAGE OF YOU FINANCIALLY OR OF YOUR PERSONAL PROPERTY?: NO

## 2024-12-11 SDOH — HEALTH STABILITY: MENTAL HEALTH: NON-SPECIFIC ACTIVE SUICIDAL THOUGHTS (PAST 1 MONTH): NO

## 2024-12-11 SDOH — SOCIAL STABILITY: SOCIAL INSECURITY: DOES ANYONE TRY TO KEEP YOU FROM HAVING/CONTACTING OTHER FRIENDS OR DOING THINGS OUTSIDE YOUR HOME?: NO

## 2024-12-11 SDOH — HEALTH STABILITY: MENTAL HEALTH: SUICIDAL BEHAVIOR (LIFETIME): NO

## 2024-12-11 SDOH — SOCIAL STABILITY: SOCIAL INSECURITY: HAVE YOU HAD ANY THOUGHTS OF HARMING ANYONE ELSE?: NO

## 2024-12-11 SDOH — SOCIAL STABILITY: SOCIAL INSECURITY: HAS ANYONE EVER THREATENED TO HURT YOUR FAMILY OR YOUR PETS?: NO

## 2024-12-11 SDOH — HEALTH STABILITY: MENTAL HEALTH: WISH TO BE DEAD (PAST 1 MONTH): NO

## 2024-12-11 SDOH — SOCIAL STABILITY: SOCIAL INSECURITY: HAVE YOU HAD THOUGHTS OF HARMING ANYONE ELSE?: NO

## 2024-12-11 SDOH — ECONOMIC STABILITY: HOUSING INSECURITY: DO YOU FEEL UNSAFE GOING BACK TO THE PLACE WHERE YOU ARE LIVING?: NO

## 2024-12-11 SDOH — SOCIAL STABILITY: SOCIAL INSECURITY: ARE YOU OR HAVE YOU BEEN THREATENED OR ABUSED PHYSICALLY, EMOTIONALLY, OR SEXUALLY BY ANYONE?: NO

## 2024-12-11 SDOH — HEALTH STABILITY: MENTAL HEALTH: HAVE YOU USED ANY SUBSTANCES (CANABIS, COCAINE, HEROIN, HALLUCINOGENS, INHALANTS, ETC.) IN THE PAST 12 MONTHS?: NO

## 2024-12-11 ASSESSMENT — PAIN SCALES - GENERAL
PAINLEVEL_OUTOF10: 0 - NO PAIN

## 2024-12-11 ASSESSMENT — PATIENT HEALTH QUESTIONNAIRE - PHQ9
SUM OF ALL RESPONSES TO PHQ9 QUESTIONS 1 & 2: 0
1. LITTLE INTEREST OR PLEASURE IN DOING THINGS: NOT AT ALL
2. FEELING DOWN, DEPRESSED OR HOPELESS: NOT AT ALL

## 2024-12-11 ASSESSMENT — LIFESTYLE VARIABLES
SKIP TO QUESTIONS 9-10: 1
HOW OFTEN DO YOU HAVE A DRINK CONTAINING ALCOHOL: NEVER
HOW MANY STANDARD DRINKS CONTAINING ALCOHOL DO YOU HAVE ON A TYPICAL DAY: PATIENT DOES NOT DRINK
AUDIT-C TOTAL SCORE: 0
AUDIT-C TOTAL SCORE: 0
HOW OFTEN DO YOU HAVE 6 OR MORE DRINKS ON ONE OCCASION: NEVER

## 2024-12-11 NOTE — H&P
24 1:29 PM  Obstetrical Admission History and Physical - TRIAGE    Assessment/Plan  HPI: Irma Dobson is a 34 y.o.  at 34w5d,with an FINN: 2025, by Last Menstrual Period c/w 13 week US presents to OB triage for leakage of fluid  she receives routine prenatal care with Dr. Butcher.    R/o PPROM  - SSE: Pooling/nitrazine/ferning negative x3  -[place]: irregular contractions, difficult to assess pattern due to artifact, abdomen soft on palpation  -UA: S.015, Trace Leuks, no protein, no ketones, no nitrites   -NEW: 14 on BSUS per H. ANA LAURA Corral  - Low concern for PPROM, given reassuring exam and leakage exclusively associated with coughing/sneezing, likely related to pelvic floor dysfunction  - return precautions discussed     IUP at 34w5d   -NST reactive, moderate variability, accelerations present with no decelerations  -Good fetal movement  -Prenatal labs reviewed  -Last Sono 2024: EFW 319g (27%), AC 41%  -Continue routine prenatal care  -Next appt      Maternal Well-being  -Emotional support and reassurance provided  -Patient recovering from upper respiratory illness and accepts COVID/Flu testing today, pending on discharge  -VS WNL, Afebrile  -All questions and concerns addressed     Dispo:  -The patient is appropriate for discharge home. Maternal/Fetal warning signs reviewed, the patient verbalized understanding.  -Follow up at return OB  or OB triage sooner, PRN.     The above plan and fetal tracing was discussed with Dr. Jaeger  agreed  patient is safe and stable for discharge home.    JAVIER Neely-CNP, CLC   Obstetrics & Gynecology    Subjective   Irma presents today with concerns for leakage of fluid. She reports that yesterday around 11:00am she noticed a gush of fluid with a cough and sneeze that she believes to most likely be urine. She has had viral URI symptoms for the past week, that started last Monday () with fever/chills/body aches. Today, she is  feeling much better but endorses a persistent cough. She reports persistent leakage of fluid with coughing that she believes to likely be urine. Denies leakage of fluid without cough/sneezing, denies vaginal bleeding, reports frequent fetal movement. She has follow up scheduled with Dr. Butcher on Monday.    Pregnancy notable for:  Pregnancy Problems (from 07/15/24 to present)       Problem Noted Diagnosed Resolved    Malpresentation before onset of labor ([facility]) 11/25/2024 by Zulay Butcher MD  No    Priority:  Medium       Overview Signed 11/25/2024  8:32 AM by Zulay Butcher MD     Trying inversions, discussed moxibustion         32 weeks gestation of pregnancy ([facility]) 10/28/2024 by Zulay Butcher MD  No    Priority:  Medium       Varicose veins of vulva and perineum 10/28/2024 by Zulay Butcher MD  No    Priority:  Medium       Overview Signed 10/28/2024  8:32 AM by Zulay Butcher MD     Wearing compression undergarment         Heterozygous factor V Leiden affecting pregnancy in third trimester, antepartum (Multi) 7/16/2024 by Zulay Butcher MD  No    Priority:  Medium       Overview Addendum 7/16/2024  4:23 PM by Zulay Butcher MD     No personal history of DVT/PE  Father passed away from PRESUMED massive PE  In prior pregnancy, surveillance only  ACOG PB reviewed, will plan for LMWH ppx dosing in the postpartum period         Family history of first degree relative with congenital heart disease 7/16/2024 by Zulay Butcher MD  No    Priority:  Medium       Overview Addendum 10/28/2024  8:29 AM by Zulay Butcher MD     FOB Brother with history of dextrocardia, TOGV  S/p normal fetal ECHO         Underweight 7/16/2024 by Zulay Butcher MD  No    Priority:  Medium       Overview Signed 7/16/2024  4:19 PM by Zulay Butcher MD     Prepreg BMI 18         Behcet's disease (Multi) 4/10/2003 by Zulay Butcher MD  No    Priority:  Medium       Overview Signed 7/16/2024  4:17 PM by Zulay Butcher MD      Generalized arthralgias  Usually ulcers, oral and vaginal (infrequent recurrence)                  Obstetrical History   OB History    Para Term  AB Living   2 1 1 0 0 1   SAB IAB Ectopic Multiple Live Births   0 0 0 0 1      # Outcome Date GA Lbr Jus/2nd Weight Sex Type Anes PTL Lv   2 Current            1 Term                Past Medical History  Past Medical History:   Diagnosis Date    Other specified health status     No pertinent past medical history        Past Surgical History   Past Surgical History:   Procedure Laterality Date    APPENDECTOMY  2017    Appendectomy       Social History  Social History     Tobacco Use    Smoking status: Never     Passive exposure: Never    Smokeless tobacco: Never   Substance Use Topics    Alcohol use: Never     Substance and Sexual Activity   Drug Use Never       Allergies  Patient has no known allergies.     Medications  No medications prior to admission.       Objective    Last Vitals  Temp Pulse Resp BP MAP O2 Sat   36.4 °C (97.5 °F) 69 18 113/59   99 %       Physical Exam  General: Well nourished, in no acute distress, alert, pleasant and cooperative  Head/Neck: Normocephalic, atraumatic  Cardiopulmonary: warm and well perfused, breathing comfortably on room air,S1, S2, RRR  Abdomen: Gravid, non-tender  GI/: No CVA tenderness, no organomegaly   Neurological: PERRLA. alert, oriented, normal speech, no focal findings or movement disorder noted.  Psychological: Appropriate mood and affect. Awake and alert; oriented to person, place, and time.   Extremities: Symmetric  Speculum Exam: no pooling of fluid seen, Nitrazine test is negative, Ferning test is negative, vaginal discharge thin white    Fetal Monitoring  Non-Stress Test   Baseline Fetal Heart Rate for Non-Stress Test: 120 BPM  Variability in Waveform for Non-Stress Test: Moderate  Accelerations in Non-Stress Test: Yes, greater than/equal to 15 bpm, lasting at least 15 seconds  Decelerations in  Non-Stress Test: None  Contractions in Non-Stress Test:  (uterine irritability)  Acoustic Stimulator for Non-Stress Test: No      Bedside ultrasound: Yes, NEW: 14    Lab Review    No visits with results within 1 Day(s) from this visit.   Latest known visit with results is:   Lab on 10/04/2024   Component Date Value Ref Range Status    Syphilis Total Ab 10/04/2024 Nonreactive  Nonreactive Final    No significant level of Treponema pallidum antibody detected.   Repeat testing in 2 to 4 weeks may be considered if early   infection or incubating syphilis infection is suspected.    Glucose, 1 Hr Screen, Preg 10/04/2024 85  <135 mg/dL Final    WBC 10/04/2024 7.8  4.4 - 11.3 x10*3/uL Final    nRBC 10/04/2024 0.0  0.0 - 0.0 /100 WBCs Final    RBC 10/04/2024 3.61 (L)  4.00 - 5.20 x10*6/uL Final    Hemoglobin 10/04/2024 11.4 (L)  12.0 - 16.0 g/dL Final    Hematocrit 10/04/2024 34.8 (L)  36.0 - 46.0 % Final    MCV 10/04/2024 96  80 - 100 fL Final    MCH 10/04/2024 31.6  26.0 - 34.0 pg Final    MCHC 10/04/2024 32.8  32.0 - 36.0 g/dL Final    RDW 10/04/2024 13.8  11.5 - 14.5 % Final    Platelets 10/04/2024 252  150 - 450 x10*3/uL Final    Reflex added, Anemia panel 10/04/2024 No reflex.   Final       Prenatal labs reviewed, not remarkable.

## 2024-12-16 ENCOUNTER — PHARMACY VISIT (OUTPATIENT)
Dept: PHARMACY | Facility: CLINIC | Age: 34
End: 2024-12-16
Payer: COMMERCIAL

## 2024-12-16 ENCOUNTER — ROUTINE PRENATAL (OUTPATIENT)
Dept: OBSTETRICS AND GYNECOLOGY | Facility: CLINIC | Age: 34
End: 2024-12-16
Payer: COMMERCIAL

## 2024-12-16 VITALS
DIASTOLIC BLOOD PRESSURE: 82 MMHG | SYSTOLIC BLOOD PRESSURE: 122 MMHG | WEIGHT: 146.5 LBS | BODY MASS INDEX: 22.28 KG/M2 | HEART RATE: 73 BPM

## 2024-12-16 DIAGNOSIS — O32.9XX0 MALPRESENTATION BEFORE ONSET OF LABOR, SINGLE OR UNSPECIFIED FETUS (HHS-HCC): Primary | ICD-10-CM

## 2024-12-16 DIAGNOSIS — Z3A.35 35 WEEKS GESTATION OF PREGNANCY (HHS-HCC): ICD-10-CM

## 2024-12-16 PROCEDURE — RXMED WILLOW AMBULATORY MEDICATION CHARGE

## 2024-12-16 PROCEDURE — 0501F PRENATAL FLOW SHEET: CPT | Performed by: OBSTETRICS & GYNECOLOGY

## 2024-12-16 RX ORDER — AMOXICILLIN AND CLAVULANATE POTASSIUM 875; 125 MG/1; MG/1
875 TABLET, FILM COATED ORAL 2 TIMES DAILY
Qty: 10 TABLET | Refills: 0 | OUTPATIENT
Start: 2024-12-16

## 2024-12-16 ASSESSMENT — PAIN - FUNCTIONAL ASSESSMENT: PAIN_FUNCTIONAL_ASSESSMENT: 0-10

## 2024-12-16 ASSESSMENT — PAIN SCALES - GENERAL: PAINLEVEL_OUTOF10: 0 - NO PAIN

## 2024-12-16 NOTE — PROGRESS NOTES
OB Follow-up  2024           ASSESSMENT & PLAN    Irma Dobson is a 34 y.o.  at 35w3d here for the following concerns we addressed today:    Assessment & Plan  Malpresentation before onset of labor, single or unspecified fetus ([facility])  Will attempt to schedule in early January    Labor/FM precautions reviewed  35 weeks gestation of pregnancy ([facility])             RTC in 1 weeks    Zulay Butcher MD     SUBJECTIVE    HPI: Irma Dobson is a 34 y.o.  at 35w3d here for RPNV.   Denies bleeding or further LOF. Reports normal fetal movement.     Patient reports was recently seen in triage for concern for pPROM  Was diagnosed with flu A, had a lot of coughing - likely was experiencing incontinence.      Fetus still breech - interested in ECV.  Wants to wait for early .

## 2024-12-23 ENCOUNTER — ROUTINE PRENATAL (OUTPATIENT)
Dept: OBSTETRICS AND GYNECOLOGY | Facility: CLINIC | Age: 34
End: 2024-12-23
Payer: COMMERCIAL

## 2024-12-23 ENCOUNTER — PREP FOR PROCEDURE (OUTPATIENT)
Dept: OBSTETRICS AND GYNECOLOGY | Facility: CLINIC | Age: 34
End: 2024-12-23
Payer: COMMERCIAL

## 2024-12-23 VITALS
BODY MASS INDEX: 22.93 KG/M2 | HEART RATE: 94 BPM | DIASTOLIC BLOOD PRESSURE: 75 MMHG | WEIGHT: 150.8 LBS | SYSTOLIC BLOOD PRESSURE: 114 MMHG

## 2024-12-23 DIAGNOSIS — Z3A.36 36 WEEKS GESTATION OF PREGNANCY (HHS-HCC): ICD-10-CM

## 2024-12-23 PROCEDURE — 87081 CULTURE SCREEN ONLY: CPT | Performed by: OBSTETRICS & GYNECOLOGY

## 2024-12-23 PROCEDURE — 0501F PRENATAL FLOW SHEET: CPT | Performed by: OBSTETRICS & GYNECOLOGY

## 2024-12-23 ASSESSMENT — ENCOUNTER SYMPTOMS
CONSTITUTIONAL NEGATIVE: 0
CARDIOVASCULAR NEGATIVE: 0
PSYCHIATRIC NEGATIVE: 0
ENDOCRINE NEGATIVE: 0
MUSCULOSKELETAL NEGATIVE: 0
NEUROLOGICAL NEGATIVE: 0
EYES NEGATIVE: 0
HEMATOLOGIC/LYMPHATIC NEGATIVE: 0
GASTROINTESTINAL NEGATIVE: 0
ALLERGIC/IMMUNOLOGIC NEGATIVE: 0
RESPIRATORY NEGATIVE: 0

## 2024-12-23 ASSESSMENT — PAIN SCALES - GENERAL: PAINLEVEL_OUTOF10: 0 - NO PAIN

## 2024-12-23 ASSESSMENT — PAIN - FUNCTIONAL ASSESSMENT: PAIN_FUNCTIONAL_ASSESSMENT: 0-10

## 2024-12-25 LAB — GP B STREP GENITAL QL CULT: NORMAL

## 2025-01-02 ENCOUNTER — LAB (OUTPATIENT)
Dept: LAB | Facility: LAB | Age: 35
End: 2025-01-02
Payer: COMMERCIAL

## 2025-01-02 DIAGNOSIS — Z01.818 PREOPERATIVE EXAM FOR GYNECOLOGIC SURGERY: Primary | ICD-10-CM

## 2025-01-02 DIAGNOSIS — Z01.818 PREOPERATIVE EXAM FOR GYNECOLOGIC SURGERY: ICD-10-CM

## 2025-01-02 LAB
ABO GROUP (TYPE) IN BLOOD: NORMAL
ANTIBODY SCREEN: NORMAL
ERYTHROCYTE [DISTWIDTH] IN BLOOD BY AUTOMATED COUNT: 12.7 % (ref 11.5–14.5)
HCT VFR BLD AUTO: 37.2 % (ref 36–46)
HGB BLD-MCNC: 12.5 G/DL (ref 12–16)
MCH RBC QN AUTO: 31.3 PG (ref 26–34)
MCHC RBC AUTO-ENTMCNC: 33.6 G/DL (ref 32–36)
MCV RBC AUTO: 93 FL (ref 80–100)
NRBC BLD-RTO: 0 /100 WBCS (ref 0–0)
PLATELET # BLD AUTO: 243 X10*3/UL (ref 150–450)
RBC # BLD AUTO: 4 X10*6/UL (ref 4–5.2)
RH FACTOR (ANTIGEN D): NORMAL
WBC # BLD AUTO: 7.7 X10*3/UL (ref 4.4–11.3)

## 2025-01-02 PROCEDURE — 86923 COMPATIBILITY TEST ELECTRIC: CPT

## 2025-01-02 PROCEDURE — 86850 RBC ANTIBODY SCREEN: CPT

## 2025-01-02 PROCEDURE — 86901 BLOOD TYPING SEROLOGIC RH(D): CPT

## 2025-01-02 PROCEDURE — 85027 COMPLETE CBC AUTOMATED: CPT

## 2025-01-02 PROCEDURE — 86900 BLOOD TYPING SEROLOGIC ABO: CPT

## 2025-01-02 NOTE — PROGRESS NOTES
Initial BP taken immediately upon arrive, after running up the stairs  Repeat more indicated of baseline  No symptoms.      Fetus still in breech presentation  Desires to move forward with ECV - wants to wait until after 12/31/24, if possible.      Labor/FM precautions discussed.    Scheduled for ECV on 1/3/25    GBS today.    Did have recent PCN exposure for sinusitis.    Unsure of how that could affect GBS screening.

## 2025-01-03 ENCOUNTER — ANESTHESIA (OUTPATIENT)
Dept: OBSTETRICS AND GYNECOLOGY | Facility: HOSPITAL | Age: 35
End: 2025-01-03
Payer: COMMERCIAL

## 2025-01-03 ENCOUNTER — HOSPITAL ENCOUNTER (INPATIENT)
Facility: HOSPITAL | Age: 35
Discharge: HOME | End: 2025-01-03
Attending: OBSTETRICS & GYNECOLOGY | Admitting: STUDENT IN AN ORGANIZED HEALTH CARE EDUCATION/TRAINING PROGRAM
Payer: COMMERCIAL

## 2025-01-03 ENCOUNTER — ANESTHESIA EVENT (OUTPATIENT)
Dept: OBSTETRICS AND GYNECOLOGY | Facility: HOSPITAL | Age: 35
End: 2025-01-03
Payer: COMMERCIAL

## 2025-01-03 DIAGNOSIS — D68.51 HETEROZYGOUS FACTOR V LEIDEN AFFECTING PREGNANCY IN THIRD TRIMESTER, ANTEPARTUM (MULTI): Primary | ICD-10-CM

## 2025-01-03 DIAGNOSIS — D62 ABLA (ACUTE BLOOD LOSS ANEMIA): ICD-10-CM

## 2025-01-03 DIAGNOSIS — O99.113 HETEROZYGOUS FACTOR V LEIDEN AFFECTING PREGNANCY IN THIRD TRIMESTER, ANTEPARTUM (MULTI): Primary | ICD-10-CM

## 2025-01-03 PROBLEM — Z98.890 PONV (POSTOPERATIVE NAUSEA AND VOMITING): Status: ACTIVE | Noted: 2025-01-03

## 2025-01-03 PROBLEM — R11.2 PONV (POSTOPERATIVE NAUSEA AND VOMITING): Status: ACTIVE | Noted: 2025-01-03

## 2025-01-03 PROBLEM — O32.9XX0 MALPRESENTATION OF FETUS, ANTEPARTUM (HHS-HCC): Status: ACTIVE | Noted: 2025-01-03

## 2025-01-03 LAB — TREPONEMA PALLIDUM IGG+IGM AB [PRESENCE] IN SERUM OR PLASMA BY IMMUNOASSAY: NONREACTIVE

## 2025-01-03 PROCEDURE — 36415 COLL VENOUS BLD VENIPUNCTURE: CPT

## 2025-01-03 PROCEDURE — 2500000005 HC RX 250 GENERAL PHARMACY W/O HCPCS: Performed by: ANESTHESIOLOGIST ASSISTANT

## 2025-01-03 PROCEDURE — 2500000004 HC RX 250 GENERAL PHARMACY W/ HCPCS (ALT 636 FOR OP/ED): Performed by: ANESTHESIOLOGIST ASSISTANT

## 2025-01-03 PROCEDURE — 59412 ANTEPARTUM MANIPULATION: CPT | Performed by: OBSTETRICS & GYNECOLOGY

## 2025-01-03 PROCEDURE — 10S0XZZ REPOSITION PRODUCTS OF CONCEPTION, EXTERNAL APPROACH: ICD-10-PCS | Performed by: STUDENT IN AN ORGANIZED HEALTH CARE EDUCATION/TRAINING PROGRAM

## 2025-01-03 PROCEDURE — 1120000001 HC OB PRIVATE ROOM DAILY

## 2025-01-03 PROCEDURE — 86780 TREPONEMA PALLIDUM: CPT

## 2025-01-03 PROCEDURE — 2500000004 HC RX 250 GENERAL PHARMACY W/ HCPCS (ALT 636 FOR OP/ED)

## 2025-01-03 PROCEDURE — 2500000001 HC RX 250 WO HCPCS SELF ADMINISTERED DRUGS (ALT 637 FOR MEDICARE OP)

## 2025-01-03 RX ORDER — NORETHINDRONE AND ETHINYL ESTRADIOL 0.5-0.035
KIT ORAL AS NEEDED
Status: DISCONTINUED | OUTPATIENT
Start: 2025-01-03 | End: 2025-01-03

## 2025-01-03 RX ORDER — ONDANSETRON HYDROCHLORIDE 2 MG/ML
4 INJECTION, SOLUTION INTRAVENOUS EVERY 6 HOURS PRN
Status: DISCONTINUED | OUTPATIENT
Start: 2025-01-03 | End: 2025-01-04

## 2025-01-03 RX ORDER — OXYTOCIN 10 [USP'U]/ML
10 INJECTION, SOLUTION INTRAMUSCULAR; INTRAVENOUS ONCE AS NEEDED
Status: DISCONTINUED | OUTPATIENT
Start: 2025-01-03 | End: 2025-01-04

## 2025-01-03 RX ORDER — PHENYLEPHRINE HCL IN 0.9% NACL 1 MG/10 ML
SYRINGE (ML) INTRAVENOUS AS NEEDED
Status: DISCONTINUED | OUTPATIENT
Start: 2025-01-03 | End: 2025-01-03

## 2025-01-03 RX ORDER — TERBUTALINE SULFATE 1 MG/ML
0.25 INJECTION SUBCUTANEOUS ONCE AS NEEDED
Status: COMPLETED | OUTPATIENT
Start: 2025-01-03 | End: 2025-01-03

## 2025-01-03 RX ORDER — BUPIVACAINE HYDROCHLORIDE 7.5 MG/ML
INJECTION, SOLUTION INTRASPINAL AS NEEDED
Status: DISCONTINUED | OUTPATIENT
Start: 2025-01-03 | End: 2025-01-03

## 2025-01-03 RX ORDER — LABETALOL HYDROCHLORIDE 5 MG/ML
20 INJECTION, SOLUTION INTRAVENOUS ONCE AS NEEDED
Status: DISCONTINUED | OUTPATIENT
Start: 2025-01-03 | End: 2025-01-04

## 2025-01-03 RX ORDER — METHYLERGONOVINE MALEATE 0.2 MG/ML
0.2 INJECTION INTRAVENOUS ONCE AS NEEDED
Status: DISCONTINUED | OUTPATIENT
Start: 2025-01-03 | End: 2025-01-04

## 2025-01-03 RX ORDER — ONDANSETRON 4 MG/1
4 TABLET, FILM COATED ORAL EVERY 6 HOURS PRN
Status: DISCONTINUED | OUTPATIENT
Start: 2025-01-03 | End: 2025-01-04

## 2025-01-03 RX ORDER — OXYTOCIN/0.9 % SODIUM CHLORIDE 30/500 ML
60 PLASTIC BAG, INJECTION (ML) INTRAVENOUS ONCE AS NEEDED
Status: DISCONTINUED | OUTPATIENT
Start: 2025-01-03 | End: 2025-01-04

## 2025-01-03 RX ORDER — ACETAMINOPHEN 325 MG/1
975 TABLET ORAL ONCE
Status: COMPLETED | OUTPATIENT
Start: 2025-01-03 | End: 2025-01-03

## 2025-01-03 RX ORDER — LOPERAMIDE HYDROCHLORIDE 2 MG/1
4 CAPSULE ORAL EVERY 2 HOUR PRN
Status: DISCONTINUED | OUTPATIENT
Start: 2025-01-03 | End: 2025-01-04

## 2025-01-03 RX ORDER — METOCLOPRAMIDE HYDROCHLORIDE 5 MG/ML
10 INJECTION INTRAMUSCULAR; INTRAVENOUS EVERY 6 HOURS PRN
Status: DISCONTINUED | OUTPATIENT
Start: 2025-01-03 | End: 2025-01-04

## 2025-01-03 RX ORDER — PHENYLEPHRINE 10 MG/250 ML(40 MCG/ML)IN 0.9 % SOD.CHLORIDE INTRAVENOUS
CONTINUOUS PRN
Status: DISCONTINUED | OUTPATIENT
Start: 2025-01-03 | End: 2025-01-03

## 2025-01-03 RX ORDER — LIDOCAINE HYDROCHLORIDE 10 MG/ML
30 INJECTION, SOLUTION INFILTRATION; PERINEURAL ONCE AS NEEDED
Status: DISCONTINUED | OUTPATIENT
Start: 2025-01-03 | End: 2025-01-04

## 2025-01-03 RX ORDER — FENTANYL/ROPIVACAINE/NS/PF 2MCG/ML-.2
0-25 PLASTIC BAG, INJECTION (ML) INJECTION CONTINUOUS
Status: DISCONTINUED | OUTPATIENT
Start: 2025-01-03 | End: 2025-01-04

## 2025-01-03 RX ORDER — METOCLOPRAMIDE 10 MG/1
10 TABLET ORAL EVERY 6 HOURS PRN
Status: DISCONTINUED | OUTPATIENT
Start: 2025-01-03 | End: 2025-01-04

## 2025-01-03 RX ORDER — HYDRALAZINE HYDROCHLORIDE 20 MG/ML
5 INJECTION INTRAMUSCULAR; INTRAVENOUS ONCE AS NEEDED
Status: DISCONTINUED | OUTPATIENT
Start: 2025-01-03 | End: 2025-01-04

## 2025-01-03 RX ORDER — NIFEDIPINE 10 MG/1
10 CAPSULE ORAL ONCE AS NEEDED
Status: DISCONTINUED | OUTPATIENT
Start: 2025-01-03 | End: 2025-01-04

## 2025-01-03 RX ORDER — TRANEXAMIC ACID 100 MG/ML
1000 INJECTION, SOLUTION INTRAVENOUS ONCE AS NEEDED
Status: DISCONTINUED | OUTPATIENT
Start: 2025-01-03 | End: 2025-01-04

## 2025-01-03 RX ORDER — CARBOPROST TROMETHAMINE 250 UG/ML
250 INJECTION, SOLUTION INTRAMUSCULAR ONCE AS NEEDED
Status: DISCONTINUED | OUTPATIENT
Start: 2025-01-03 | End: 2025-01-04

## 2025-01-03 RX ORDER — MISOPROSTOL 200 UG/1
800 TABLET ORAL ONCE AS NEEDED
Status: DISCONTINUED | OUTPATIENT
Start: 2025-01-03 | End: 2025-01-04

## 2025-01-03 RX ADMIN — Medication 120 MCG: at 17:08

## 2025-01-03 RX ADMIN — NORETHINDRONE AND ETHINYL ESTRADIOL 5 MG: KIT ORAL at 16:14

## 2025-01-03 RX ADMIN — PHENYLEPHRINE-NACL IV SOLUTION 10 MG/250ML-0.9% 0.74 MCG/KG/MIN: 10-0.9/25 SOLUTION at 15:38

## 2025-01-03 RX ADMIN — Medication 80 MCG: at 15:59

## 2025-01-03 RX ADMIN — EPHEDRINE SULFATE 25 MG: 50 INJECTION, SOLUTION INTRAVENOUS at 17:33

## 2025-01-03 RX ADMIN — NORETHINDRONE AND ETHINYL ESTRADIOL 5 MG: KIT ORAL at 16:43

## 2025-01-03 RX ADMIN — ONDANSETRON 4 MG: 2 INJECTION, SOLUTION INTRAMUSCULAR; INTRAVENOUS at 16:23

## 2025-01-03 RX ADMIN — Medication 80 MCG: at 15:57

## 2025-01-03 RX ADMIN — BUPIVACAINE HYDROCHLORIDE IN DEXTROSE 2 ML: 7.5 INJECTION, SOLUTION SUBARACHNOID at 15:40

## 2025-01-03 RX ADMIN — ACETAMINOPHEN 975 MG: 325 TABLET ORAL at 20:26

## 2025-01-03 RX ADMIN — TERBUTALINE SULFATE 0.25 MG: 1 INJECTION, SOLUTION SUBCUTANEOUS at 15:51

## 2025-01-03 RX ADMIN — Medication 80 MCG: at 16:43

## 2025-01-03 RX ADMIN — SODIUM CHLORIDE: 9 INJECTION, SOLUTION INTRAVENOUS at 17:04

## 2025-01-03 RX ADMIN — Medication 120 MCG: at 16:09

## 2025-01-03 RX ADMIN — SODIUM CHLORIDE 500 ML: 9 INJECTION, SOLUTION INTRAVENOUS at 15:20

## 2025-01-03 RX ADMIN — SODIUM CHLORIDE: 9 INJECTION, SOLUTION INTRAVENOUS at 15:20

## 2025-01-03 RX ADMIN — EPHEDRINE SULFATE 25 MG: 50 INJECTION, SOLUTION INTRAVENOUS at 16:11

## 2025-01-03 RX ADMIN — Medication 120 MCG: at 16:05

## 2025-01-03 RX ADMIN — NORETHINDRONE AND ETHINYL ESTRADIOL 5 MG: KIT ORAL at 16:10

## 2025-01-03 SDOH — HEALTH STABILITY: MENTAL HEALTH: WISH TO BE DEAD (PAST 1 MONTH): NO

## 2025-01-03 SDOH — ECONOMIC STABILITY: FOOD INSECURITY: WITHIN THE PAST 12 MONTHS, THE FOOD YOU BOUGHT JUST DIDN'T LAST AND YOU DIDN'T HAVE MONEY TO GET MORE.: NEVER TRUE

## 2025-01-03 SDOH — HEALTH STABILITY: MENTAL HEALTH: NON-SPECIFIC ACTIVE SUICIDAL THOUGHTS (PAST 1 MONTH): NO

## 2025-01-03 SDOH — HEALTH STABILITY: MENTAL HEALTH: WERE YOU ABLE TO COMPLETE ALL THE BEHAVIORAL HEALTH SCREENINGS?: YES

## 2025-01-03 SDOH — SOCIAL STABILITY: SOCIAL INSECURITY: VERBAL ABUSE: DENIES

## 2025-01-03 SDOH — ECONOMIC STABILITY: FOOD INSECURITY: WITHIN THE PAST 12 MONTHS, YOU WORRIED THAT YOUR FOOD WOULD RUN OUT BEFORE YOU GOT THE MONEY TO BUY MORE.: NEVER TRUE

## 2025-01-03 SDOH — HEALTH STABILITY: MENTAL HEALTH: SUICIDAL BEHAVIOR (LIFETIME): NO

## 2025-01-03 SDOH — SOCIAL STABILITY: SOCIAL INSECURITY: ABUSE SCREEN: ADULT

## 2025-01-03 SDOH — SOCIAL STABILITY: SOCIAL INSECURITY: HAVE YOU HAD THOUGHTS OF HARMING ANYONE ELSE?: NO

## 2025-01-03 SDOH — SOCIAL STABILITY: SOCIAL INSECURITY
WITHIN THE LAST YEAR, HAVE YOU BEEN RAPED OR FORCED TO HAVE ANY KIND OF SEXUAL ACTIVITY BY YOUR PARTNER OR EX-PARTNER?: NO

## 2025-01-03 SDOH — SOCIAL STABILITY: SOCIAL INSECURITY: WITHIN THE LAST YEAR, HAVE YOU BEEN AFRAID OF YOUR PARTNER OR EX-PARTNER?: NO

## 2025-01-03 SDOH — SOCIAL STABILITY: SOCIAL INSECURITY: ARE THERE ANY APPARENT SIGNS OF INJURIES/BEHAVIORS THAT COULD BE RELATED TO ABUSE/NEGLECT?: NO

## 2025-01-03 SDOH — SOCIAL STABILITY: SOCIAL INSECURITY: DOES ANYONE TRY TO KEEP YOU FROM HAVING/CONTACTING OTHER FRIENDS OR DOING THINGS OUTSIDE YOUR HOME?: NO

## 2025-01-03 SDOH — SOCIAL STABILITY: SOCIAL INSECURITY: ARE YOU OR HAVE YOU BEEN THREATENED OR ABUSED PHYSICALLY, EMOTIONALLY, OR SEXUALLY BY ANYONE?: NO

## 2025-01-03 SDOH — HEALTH STABILITY: MENTAL HEALTH: CURRENT SMOKER: 0

## 2025-01-03 SDOH — SOCIAL STABILITY: SOCIAL INSECURITY: WITHIN THE LAST YEAR, HAVE YOU BEEN HUMILIATED OR EMOTIONALLY ABUSED IN OTHER WAYS BY YOUR PARTNER OR EX-PARTNER?: NO

## 2025-01-03 SDOH — HEALTH STABILITY: MENTAL HEALTH: HAVE YOU USED ANY SUBSTANCES (CANABIS, COCAINE, HEROIN, HALLUCINOGENS, INHALANTS, ETC.) IN THE PAST 12 MONTHS?: NO

## 2025-01-03 SDOH — ECONOMIC STABILITY: HOUSING INSECURITY: DO YOU FEEL UNSAFE GOING BACK TO THE PLACE WHERE YOU ARE LIVING?: NO

## 2025-01-03 SDOH — SOCIAL STABILITY: SOCIAL INSECURITY
WITHIN THE LAST YEAR, HAVE YOU BEEN KICKED, HIT, SLAPPED, OR OTHERWISE PHYSICALLY HURT BY YOUR PARTNER OR EX-PARTNER?: NO

## 2025-01-03 SDOH — SOCIAL STABILITY: SOCIAL INSECURITY: PHYSICAL ABUSE: DENIES

## 2025-01-03 SDOH — ECONOMIC STABILITY: FOOD INSECURITY: HOW HARD IS IT FOR YOU TO PAY FOR THE VERY BASICS LIKE FOOD, HOUSING, MEDICAL CARE, AND HEATING?: NOT HARD AT ALL

## 2025-01-03 SDOH — SOCIAL STABILITY: SOCIAL INSECURITY: DO YOU FEEL ANYONE HAS EXPLOITED OR TAKEN ADVANTAGE OF YOU FINANCIALLY OR OF YOUR PERSONAL PROPERTY?: NO

## 2025-01-03 SDOH — ECONOMIC STABILITY: TRANSPORTATION INSECURITY: IN THE PAST 12 MONTHS, HAS LACK OF TRANSPORTATION KEPT YOU FROM MEDICAL APPOINTMENTS OR FROM GETTING MEDICATIONS?: NO

## 2025-01-03 SDOH — HEALTH STABILITY: MENTAL HEALTH: HAVE YOU USED ANY PRESCRIPTION DRUGS OTHER THAN PRESCRIBED IN THE PAST 12 MONTHS?: NO

## 2025-01-03 SDOH — SOCIAL STABILITY: SOCIAL INSECURITY: HAS ANYONE EVER THREATENED TO HURT YOUR FAMILY OR YOUR PETS?: NO

## 2025-01-03 ASSESSMENT — PAIN SCALES - GENERAL
PAINLEVEL_OUTOF10: 0 - NO PAIN
PAINLEVEL_OUTOF10: 3
PAINLEVEL_OUTOF10: 0 - NO PAIN

## 2025-01-03 ASSESSMENT — LIFESTYLE VARIABLES
AUDIT-C TOTAL SCORE: 0
HOW OFTEN DO YOU HAVE 6 OR MORE DRINKS ON ONE OCCASION: NEVER
HOW MANY STANDARD DRINKS CONTAINING ALCOHOL DO YOU HAVE ON A TYPICAL DAY: PATIENT DOES NOT DRINK
HOW OFTEN DO YOU HAVE A DRINK CONTAINING ALCOHOL: NEVER
SKIP TO QUESTIONS 9-10: 1
AUDIT-C TOTAL SCORE: 0

## 2025-01-03 ASSESSMENT — PATIENT HEALTH QUESTIONNAIRE - PHQ9
1. LITTLE INTEREST OR PLEASURE IN DOING THINGS: NOT AT ALL
SUM OF ALL RESPONSES TO PHQ9 QUESTIONS 1 & 2: 0
2. FEELING DOWN, DEPRESSED OR HOPELESS: NOT AT ALL

## 2025-01-03 ASSESSMENT — PAIN DESCRIPTION - DESCRIPTORS: DESCRIPTORS: HEADACHE

## 2025-01-03 ASSESSMENT — ACTIVITIES OF DAILY LIVING (ADL): LACK_OF_TRANSPORTATION: NO

## 2025-01-03 NOTE — ANESTHESIA PREPROCEDURE EVALUATION
Patient: Irma Dobson    Evaluation Method: In-person visit    Procedure Information       Date/Time: 01/03/25 1235    Procedure: EXTERNAL CEPHALIC VERSION - breech version @ 38 wks    Location: [facility] 04 / Virtual [facility] 2 OB    Surgeons: Priscilla Garcia MD            Relevant Problems   Anesthesia   (+) PONV (postoperative nausea and vomiting)      Cardiac (within normal limits)      Pulmonary (within normal limits)      Neuro (within normal limits)      GI (within normal limits)      /Renal (within normal limits)      Liver (within normal limits)      Endocrine (within normal limits)      Hematology   (+) Heterozygous factor V Leiden affecting pregnancy in third trimester, antepartum (Multi)      Musculoskeletal (within normal limits)      HEENT (within normal limits)      GYN   (+) 35 weeks gestation of pregnancy ([facility])       Clinical information reviewed:    Allergies  Meds               NPO Detail:  No data recorded     OB/Gyn Evaluation    Present Pregnancy    Patient is pregnant now.   Obstetric History                Physical Exam    Airway  Mallampati: II  TM distance: >3 FB  Neck ROM: full     Cardiovascular - normal exam     Dental - normal exam     Pulmonary - normal exam     Abdominal - normal exam           Anesthesia Plan    History of general anesthesia?: yes  History of complications of general anesthesia?: no    ASA 2     spinal     The patient is not a current smoker.  Education provided regarding risk of obstructive sleep apnea.  Anesthetic plan and risks discussed with patient.  Use of blood products discussed with patient who.    Plan discussed with CAA.

## 2025-01-03 NOTE — ANESTHESIA PROCEDURE NOTES
CSE Block    Patient location during procedure: OB  Start time: 1/3/2025 3:26 PM  End time: 1/3/2025 3:41 PM  Reason for block: primary anesthetic}  Staffing  Performed: LILY   Authorized by: García Lee MD    Performed by: LILY Jovel    Preanesthetic Checklist  Completed: patient identified, IV checked, risks and benefits discussed, surgical consent, monitors and equipment checked, pre-op evaluation, timeout performed and sterile techniques followed  Block Timeout  RN/Licensed healthcare professional reads aloud to the Anesthesia provider and entire team: Patient identity, procedure with side and site, patient position, and as applicable the availability of implants/special equipment/special requirements.  Patient on coagulant treatment: no (ASA)  Timeout performed at: 1/3/2025 3:27 PM    CSE Block  Patient position: sitting  Prep: ChloraPrep  Sterility prep: drape, gloves and mask  Sedation level: no sedation  Patient monitoring: blood pressure, continuous pulse oximetry and heart rate  Approach: midline  Local numbing: lidocaine 1% to skin and subcutaneous tissues  Vertebral space: lumbar  L3-4  Guidance: landmark technique    Epidural Needle  CHIQUITA technique: saline  Needle type: Tuohy   Needle gauge: 17 G  Needle length: 8.9 cm  Needle insertion depth: 4.5 cm  Spinal Needle  Needle type: pencil-point   Needle gauge: 25 G  Needle length: 10.2 cm  Free flow CSF: yes  Epidural Catheter  Catheter type: multi-orifice  Catheter size: 19 G  Catheter at skin depth: 9.5 cm  Catheter securement method: clear occlusive dressing              Assessment  Procedure assessment: patient tolerated procedure well with no immediate complications

## 2025-01-03 NOTE — DISCHARGE SUMMARY
Discharge Summary    Admission Date: 1/3/2025  Discharge Date: 1/3/2025    Discharge Diagnosis  Breech presentation, no version ([facility])    Hospital Course  Procedures: ECV (failed)    Irma Dboson is a 34 y.o.  at 38w0d who presented for external cephalic version in the setting of breech presentation that was ultimately unsuccessful. Fetal heart tracing remained re-assuring throughout and after the procedure. Patient was discharged home in stable condition with plans to return for a pLTCS at 39wga.     Pertinent Physical Exam At Time of Discharge  General: no acute distress  HEENT: normocephalic, atraumatic  CV: warm and well perfused  Lungs: breathing comfortably on room air  Abdomen: Gravid, non-tender   Extremities: moving all extremities spontaneously  Neuro: awake and conversant  Psych: appropriate mood and affect      NST  Baseline: 134  Variability: mod  Accels: +  Decels: -     [place]: irregular ctx    Last Vitals:  Temp Pulse Resp BP MAP Pulse Ox   36.7 °C (98.1 °F) 87 18 115/61 81 99 %     Discharge Meds     Your medication list        CONTINUE taking these medications        Instructions Last Dose Given Next Dose Due   aspirin 81 mg EC tablet           ondansetron ODT 4 mg disintegrating tablet  Commonly known as: Zofran-ODT      Dissolve 1 tablet in mouth every 6 hours as needed for nausea.       Prenatal Vitamin 27 mg iron- 800 mcg tablet  Generic drug: prenatal vit no.124-iron-folic                     Complications Requiring Follow-Up  None     Test Results Pending At Discharge  Pending Labs       No current pending labs.            Outpatient Follow-Up  No future appointments.      Seen & d/w OZIEL Anguiano MD  PGY-II, Obstetrics & Gynecology   [facility]

## 2025-01-03 NOTE — H&P
OB Admission H&P    Assessment/Plan    Irma Dobson is a 34 y.o.  at 38w0d. FINN: 2025, by Last Menstrual Period.     ECV  - Complete breech  - Reviewed recommendation for neuraxial analgesia as well as tocolysis as the combination is associated with higher rates of success. Additionally, discussed that neuraxial anesthesia would allow us to proceed directly with pCS should complications arise. Discussed risk of placental abruption, ROM, umbilical cord prolapse, stillbirth, and need to convert to urgent CS. Reviewed monitoring fetal heart rate changes during the procedure.     Fetal Status  -NST reactive, reassuring   -Presentation breech based on ultrasound  -EFW 7.5# by Leopolds  -GBS neg    Postpartum   -Contraception Plan:  interval IUD at pp visit    Seen and discussed w/ Dr. Radha Limon MD PGY-2  Obstetrics & Gynecology      Pregnancy Problems (from 07/15/24 to present)       Problem Noted Diagnosed Resolved    Malpresentation of fetus, antepartum ([facility]) 1/3/2025 by Jane Limon MD  No    Priority:  Medium       Malpresentation before onset of labor ([facility]) 2024 by Zulay Butcher MD  No    Priority:  Medium       Overview Signed 2024  8:32 AM by Zulay Butcher MD     Trying inversions, discussed moxibustion         35 weeks gestation of pregnancy ([facility]) 10/28/2024 by Zulay Butcher MD  No    Priority:  Medium       Varicose veins of vulva and perineum 10/28/2024 by Zulay Butcher MD  No    Priority:  Medium       Overview Signed 10/28/2024  8:32 AM by Zulay Butcher MD     Wearing compression undergarment         Heterozygous factor V Leiden affecting pregnancy in third trimester, antepartum ([facility]) 2024 by Zulay Butcher MD  No    Priority:  Medium       Overview Addendum 2024  4:23 PM by Zulay Butcher MD     No personal history of DVT/PE  Father passed away from PRESUMED massive PE  In prior pregnancy, surveillance only  ACOG PB reviewed,  will plan for LMWH ppx dosing in the postpartum period         Family history of first degree relative with congenital heart disease 2024 by Zulay Butcher MD  No    Priority:  Medium       Overview Addendum 10/28/2024  8:29 AM by Zulay Butcher MD     FOB Brother with history of dextrocardia, TOGV  S/p normal fetal ECHO         Underweight 2024 by Zulay Butcher MD  No    Priority:  Medium       Overview Signed 2024  4:19 PM by Zulay Butcher MD     Prepreg BMI 18         Behcet's disease ([facility]) 4/10/2003 by Zulay Butcher MD  No    Priority:  Medium       Overview Signed 2024  4:17 PM by Zualy Butcher MD     Generalized arthralgias  Usually ulcers, oral and vaginal (infrequent recurrence)                 Subjective   Patient denies any contractions, VB, or LOF. Reporting good FM.    Pregnancy n/f:  - Behcet's disease (generalized arthralgias, oral and rarely vaginal ulcers)  - Heterozygous Factor V Leiden, no personal hx DVT/PE, father passed away from presumed massive PE, surveillance only  - family hx of congenital heart disease, normal fetal echo this pregnancy    OB History    Para Term  AB Living   2 1 1 0 0 1   SAB IAB Ectopic Multiple Live Births   0 0 0 0 1      # Outcome Date GA Lbr Jus/2nd Weight Sex Type Anes PTL Lv   2 Current            1 Term                Past Surgical History:   Procedure Laterality Date    APPENDECTOMY  2017    Appendectomy       Social History     Tobacco Use    Smoking status: Never     Passive exposure: Never    Smokeless tobacco: Never   Substance Use Topics    Alcohol use: Never       No Known Allergies    Medications Prior to Admission   Medication Sig Dispense Refill Last Dose/Taking    aspirin 81 mg EC tablet Take 1 tablet (81 mg) by mouth once daily. Patient  states  she takes 2 a day   2025 Evening    prenatal vit no.124-iron-folic (Prenatal Vitamin) 27 mg iron- 800 mcg tablet Take by mouth.   2025    ondansetron  ODT (Zofran-ODT) 4 mg disintegrating tablet Dissolve 1 tablet in mouth every 6 hours as needed for nausea. 30 tablet 2      Objective     Last Vitals  Temp Pulse Resp BP MAP O2 Sat   36.7 °C (98.1 °F) 75 18 128/75 94 99 %     Blood Pressures         1/3/2025  1118             BP: 128/75             Physical Exam  General: NAD, mood appropriate  Cardiopulmonary: warm and well perfused, breathing comfortably on room air  Abdomen: Gravid, non-tender  Extremities: Symmetric       Fetal Monitoring  Baseline: 120 bpm, Variability: moderate,  Accelerations: present and Decelerations: none  Uterine Activity: Irregular contractions  Interpretation: Reactive    Bedside ultrasound: Yes      Results from last 7 days   Lab Units 01/02/25  1105   WBC AUTO x10*3/uL 7.7   HEMOGLOBIN g/dL 12.5   HEMATOCRIT % 37.2   PLATELETS AUTO x10*3/uL 243

## 2025-01-03 NOTE — ANESTHESIA POSTPROCEDURE EVALUATION
Patient: Irma Dobson    Procedure Summary       Date: 01/03/25 Room / Location: [facility] OB 04 / Virtual [facility] 2 OB    Anesthesia Start: 1520 Anesthesia Stop: 1801    Procedure: EXTERNAL CEPHALIC VERSION Diagnosis:       Breech presentation, single or unspecified fetus ([facility])      (Breech presentation, single or unspecified fetus ([facility]) [O32.1XX0])    Surgeons: Priscilla Garcia MD Responsible Provider: Edy Ballard MD    Anesthesia Type: spinal ASA Status: 2            Anesthesia Type: spinal    Vitals Value Taken Time   /55 01/03/25 1756   Temp 36.3 01/03/25 1803   Pulse 85 01/03/25 1759   Resp 14 01/03/25 1803   SpO2 99 % 01/03/25 1759       Anesthesia Post Evaluation    Patient location during evaluation: bedside  Patient participation: complete - patient participated  Level of consciousness: awake and alert  Pain management: adequate  Airway patency: patent  Cardiovascular status: acceptable, hemodynamically stable and stable  Respiratory status: acceptable and room air  Hydration status: stable  Postoperative Nausea and Vomiting: none      There were no known notable events for this encounter.

## 2025-01-04 ENCOUNTER — ANESTHESIA EVENT (OUTPATIENT)
Dept: OBSTETRICS AND GYNECOLOGY | Facility: HOSPITAL | Age: 35
End: 2025-01-04
Payer: COMMERCIAL

## 2025-01-04 LAB
APTT PPP: 28 SECONDS (ref 27–38)
APTT PPP: 29 SECONDS (ref 27–38)
ERYTHROCYTE [DISTWIDTH] IN BLOOD BY AUTOMATED COUNT: 13.1 % (ref 11.5–14.5)
ERYTHROCYTE [DISTWIDTH] IN BLOOD BY AUTOMATED COUNT: 13.2 % (ref 11.5–14.5)
FIBRINOGEN PPP-MCNC: 185 MG/DL (ref 200–400)
FIBRINOGEN PPP-MCNC: 308 MG/DL (ref 200–400)
GLUCOSE BLD MANUAL STRIP-MCNC: 73 MG/DL (ref 74–99)
GLUCOSE BLD MANUAL STRIP-MCNC: 75 MG/DL (ref 74–99)
HCT VFR BLD AUTO: 21.3 % (ref 36–46)
HCT VFR BLD AUTO: 26.4 % (ref 36–46)
HGB BLD-MCNC: 7.1 G/DL (ref 12–16)
HGB BLD-MCNC: 8.6 G/DL (ref 12–16)
INR PPP: 0.9 (ref 0.9–1.1)
INR PPP: 1 (ref 0.9–1.1)
MCH RBC QN AUTO: 31.3 PG (ref 26–34)
MCH RBC QN AUTO: 32.3 PG (ref 26–34)
MCHC RBC AUTO-ENTMCNC: 32.6 G/DL (ref 32–36)
MCHC RBC AUTO-ENTMCNC: 33.3 G/DL (ref 32–36)
MCV RBC AUTO: 96 FL (ref 80–100)
MCV RBC AUTO: 97 FL (ref 80–100)
NRBC BLD-RTO: 0 /100 WBCS (ref 0–0)
NRBC BLD-RTO: 0 /100 WBCS (ref 0–0)
PLATELET # BLD AUTO: 146 X10*3/UL (ref 150–450)
PLATELET # BLD AUTO: 157 X10*3/UL (ref 150–450)
PROTHROMBIN TIME: 10.8 SECONDS (ref 9.8–12.8)
PROTHROMBIN TIME: 9.7 SECONDS (ref 9.8–12.8)
RBC # BLD AUTO: 2.2 X10*6/UL (ref 4–5.2)
RBC # BLD AUTO: 2.75 X10*6/UL (ref 4–5.2)
WBC # BLD AUTO: 10.5 X10*3/UL (ref 4.4–11.3)
WBC # BLD AUTO: 11.9 X10*3/UL (ref 4.4–11.3)

## 2025-01-04 PROCEDURE — 85730 THROMBOPLASTIN TIME PARTIAL: CPT | Performed by: STUDENT IN AN ORGANIZED HEALTH CARE EDUCATION/TRAINING PROGRAM

## 2025-01-04 PROCEDURE — P9012 CRYOPRECIPITATE EACH UNIT: HCPCS

## 2025-01-04 PROCEDURE — 2500000002 HC RX 250 W HCPCS SELF ADMINISTERED DRUGS (ALT 637 FOR MEDICARE OP, ALT 636 FOR OP/ED): Performed by: STUDENT IN AN ORGANIZED HEALTH CARE EDUCATION/TRAINING PROGRAM

## 2025-01-04 PROCEDURE — 85027 COMPLETE CBC AUTOMATED: CPT | Performed by: STUDENT IN AN ORGANIZED HEALTH CARE EDUCATION/TRAINING PROGRAM

## 2025-01-04 PROCEDURE — 7100000016 HC LABOR RECOVERY PER HOUR: Performed by: STUDENT IN AN ORGANIZED HEALTH CARE EDUCATION/TRAINING PROGRAM

## 2025-01-04 PROCEDURE — 85610 PROTHROMBIN TIME: CPT | Performed by: STUDENT IN AN ORGANIZED HEALTH CARE EDUCATION/TRAINING PROGRAM

## 2025-01-04 PROCEDURE — 82947 ASSAY GLUCOSE BLOOD QUANT: CPT

## 2025-01-04 PROCEDURE — 2500000004 HC RX 250 GENERAL PHARMACY W/ HCPCS (ALT 636 FOR OP/ED)

## 2025-01-04 PROCEDURE — 7100000016 HC LABOR RECOVERY PER HOUR

## 2025-01-04 PROCEDURE — 3700000014 HC AN EPIDURAL BLOCK CHARGE: Performed by: STUDENT IN AN ORGANIZED HEALTH CARE EDUCATION/TRAINING PROGRAM

## 2025-01-04 PROCEDURE — 59514 CESAREAN DELIVERY ONLY: CPT | Performed by: STUDENT IN AN ORGANIZED HEALTH CARE EDUCATION/TRAINING PROGRAM

## 2025-01-04 PROCEDURE — 2500000001 HC RX 250 WO HCPCS SELF ADMINISTERED DRUGS (ALT 637 FOR MEDICARE OP): Performed by: ANESTHESIOLOGIST ASSISTANT

## 2025-01-04 PROCEDURE — 1210000001 HC SEMI-PRIVATE ROOM DAILY

## 2025-01-04 PROCEDURE — 2500000001 HC RX 250 WO HCPCS SELF ADMINISTERED DRUGS (ALT 637 FOR MEDICARE OP): Performed by: STUDENT IN AN ORGANIZED HEALTH CARE EDUCATION/TRAINING PROGRAM

## 2025-01-04 PROCEDURE — 2500000004 HC RX 250 GENERAL PHARMACY W/ HCPCS (ALT 636 FOR OP/ED): Performed by: ANESTHESIOLOGIST ASSISTANT

## 2025-01-04 PROCEDURE — 85384 FIBRINOGEN ACTIVITY: CPT | Performed by: STUDENT IN AN ORGANIZED HEALTH CARE EDUCATION/TRAINING PROGRAM

## 2025-01-04 PROCEDURE — 88307 TISSUE EXAM BY PATHOLOGIST: CPT | Mod: TC,SUR | Performed by: STUDENT IN AN ORGANIZED HEALTH CARE EDUCATION/TRAINING PROGRAM

## 2025-01-04 PROCEDURE — 2500000004 HC RX 250 GENERAL PHARMACY W/ HCPCS (ALT 636 FOR OP/ED): Performed by: STUDENT IN AN ORGANIZED HEALTH CARE EDUCATION/TRAINING PROGRAM

## 2025-01-04 PROCEDURE — 2720000007 HC OR 272 NO HCPCS: Performed by: STUDENT IN AN ORGANIZED HEALTH CARE EDUCATION/TRAINING PROGRAM

## 2025-01-04 PROCEDURE — 2500000001 HC RX 250 WO HCPCS SELF ADMINISTERED DRUGS (ALT 637 FOR MEDICARE OP)

## 2025-01-04 PROCEDURE — 88307 TISSUE EXAM BY PATHOLOGIST: CPT | Performed by: PATHOLOGY

## 2025-01-04 PROCEDURE — 59515 CESAREAN DELIVERY: CPT | Performed by: STUDENT IN AN ORGANIZED HEALTH CARE EDUCATION/TRAINING PROGRAM

## 2025-01-04 PROCEDURE — P9016 RBC LEUKOCYTES REDUCED: HCPCS

## 2025-01-04 PROCEDURE — 36430 TRANSFUSION BLD/BLD COMPNT: CPT

## 2025-01-04 PROCEDURE — P9045 ALBUMIN (HUMAN), 5%, 250 ML: HCPCS | Mod: JZ | Performed by: ANESTHESIOLOGIST ASSISTANT

## 2025-01-04 RX ORDER — ACETAMINOPHEN 120 MG/1
SUPPOSITORY RECTAL AS NEEDED
Status: DISCONTINUED | OUTPATIENT
Start: 2025-01-04 | End: 2025-01-04

## 2025-01-04 RX ORDER — ENOXAPARIN SODIUM 100 MG/ML
40 INJECTION SUBCUTANEOUS EVERY 24 HOURS
Status: DISCONTINUED | OUTPATIENT
Start: 2025-01-04 | End: 2025-01-06 | Stop reason: HOSPADM

## 2025-01-04 RX ORDER — IBUPROFEN 600 MG/1
600 TABLET ORAL EVERY 6 HOURS
Status: DISCONTINUED | OUTPATIENT
Start: 2025-01-05 | End: 2025-01-06 | Stop reason: HOSPADM

## 2025-01-04 RX ORDER — KETOROLAC TROMETHAMINE 30 MG/ML
30 INJECTION, SOLUTION INTRAMUSCULAR; INTRAVENOUS EVERY 6 HOURS
Status: COMPLETED | OUTPATIENT
Start: 2025-01-04 | End: 2025-01-05

## 2025-01-04 RX ORDER — FENTANYL CITRATE 50 UG/ML
INJECTION, SOLUTION INTRAMUSCULAR; INTRAVENOUS AS NEEDED
Status: DISCONTINUED | OUTPATIENT
Start: 2025-01-04 | End: 2025-01-04

## 2025-01-04 RX ORDER — KETOROLAC TROMETHAMINE 30 MG/ML
INJECTION, SOLUTION INTRAMUSCULAR; INTRAVENOUS AS NEEDED
Status: DISCONTINUED | OUTPATIENT
Start: 2025-01-04 | End: 2025-01-04

## 2025-01-04 RX ORDER — AZITHROMYCIN MONOHYDRATE 500 MG/5ML
INJECTION, POWDER, LYOPHILIZED, FOR SOLUTION INTRAVENOUS AS NEEDED
Status: DISCONTINUED | OUTPATIENT
Start: 2025-01-04 | End: 2025-01-04

## 2025-01-04 RX ORDER — NIFEDIPINE 10 MG/1
10 CAPSULE ORAL ONCE AS NEEDED
Status: DISCONTINUED | OUTPATIENT
Start: 2025-01-04 | End: 2025-01-06 | Stop reason: HOSPADM

## 2025-01-04 RX ORDER — FAMOTIDINE 10 MG/ML
INJECTION INTRAVENOUS AS NEEDED
Status: DISCONTINUED | OUTPATIENT
Start: 2025-01-04 | End: 2025-01-04

## 2025-01-04 RX ORDER — BISACODYL 10 MG/1
10 SUPPOSITORY RECTAL DAILY PRN
Status: DISCONTINUED | OUTPATIENT
Start: 2025-01-04 | End: 2025-01-06 | Stop reason: HOSPADM

## 2025-01-04 RX ORDER — OXYTOCIN 10 [USP'U]/ML
10 INJECTION, SOLUTION INTRAMUSCULAR; INTRAVENOUS ONCE AS NEEDED
Status: DISCONTINUED | OUTPATIENT
Start: 2025-01-04 | End: 2025-01-06 | Stop reason: HOSPADM

## 2025-01-04 RX ORDER — TRANEXAMIC ACID 100 MG/ML
1000 INJECTION, SOLUTION INTRAVENOUS ONCE AS NEEDED
Status: DISCONTINUED | OUTPATIENT
Start: 2025-01-04 | End: 2025-01-06 | Stop reason: HOSPADM

## 2025-01-04 RX ORDER — HYDROMORPHONE HYDROCHLORIDE 0.2 MG/ML
0.2 INJECTION INTRAMUSCULAR; INTRAVENOUS; SUBCUTANEOUS EVERY 5 MIN PRN
Status: DISCONTINUED | OUTPATIENT
Start: 2025-01-04 | End: 2025-01-06 | Stop reason: HOSPADM

## 2025-01-04 RX ORDER — PHENYLEPHRINE HCL IN 0.9% NACL 1 MG/10 ML
SYRINGE (ML) INTRAVENOUS AS NEEDED
Status: DISCONTINUED | OUTPATIENT
Start: 2025-01-04 | End: 2025-01-04

## 2025-01-04 RX ORDER — HYDROMORPHONE HYDROCHLORIDE 1 MG/ML
0.4 INJECTION, SOLUTION INTRAMUSCULAR; INTRAVENOUS; SUBCUTANEOUS EVERY 5 MIN PRN
Status: DISCONTINUED | OUTPATIENT
Start: 2025-01-04 | End: 2025-01-04 | Stop reason: HOSPADM

## 2025-01-04 RX ORDER — DIPHENHYDRAMINE HCL 25 MG
25 CAPSULE ORAL EVERY 4 HOURS PRN
Status: DISCONTINUED | OUTPATIENT
Start: 2025-01-04 | End: 2025-01-06 | Stop reason: HOSPADM

## 2025-01-04 RX ORDER — HYDRALAZINE HYDROCHLORIDE 20 MG/ML
5 INJECTION INTRAMUSCULAR; INTRAVENOUS ONCE AS NEEDED
Status: DISCONTINUED | OUTPATIENT
Start: 2025-01-04 | End: 2025-01-06 | Stop reason: HOSPADM

## 2025-01-04 RX ORDER — FENTANYL/ROPIVACAINE/NS/PF 2MCG/ML-.2
0-25 PLASTIC BAG, INJECTION (ML) INJECTION CONTINUOUS
Status: DISCONTINUED | OUTPATIENT
Start: 2025-01-04 | End: 2025-01-04 | Stop reason: SDUPTHER

## 2025-01-04 RX ORDER — CARBOPROST TROMETHAMINE 250 UG/ML
250 INJECTION, SOLUTION INTRAMUSCULAR ONCE AS NEEDED
Status: DISCONTINUED | OUTPATIENT
Start: 2025-01-04 | End: 2025-01-06 | Stop reason: HOSPADM

## 2025-01-04 RX ORDER — SCOPOLAMINE 1 MG/3D
1 PATCH, EXTENDED RELEASE TRANSDERMAL
Status: DISCONTINUED | OUTPATIENT
Start: 2025-01-04 | End: 2025-01-06 | Stop reason: HOSPADM

## 2025-01-04 RX ORDER — ALBUMIN HUMAN 50 G/1000ML
SOLUTION INTRAVENOUS AS NEEDED
Status: DISCONTINUED | OUTPATIENT
Start: 2025-01-04 | End: 2025-01-04

## 2025-01-04 RX ORDER — MISOPROSTOL 200 UG/1
TABLET ORAL
Status: DISPENSED
Start: 2025-01-04 | End: 2025-01-04

## 2025-01-04 RX ORDER — LABETALOL HYDROCHLORIDE 5 MG/ML
20 INJECTION, SOLUTION INTRAVENOUS ONCE AS NEEDED
Status: DISCONTINUED | OUTPATIENT
Start: 2025-01-04 | End: 2025-01-06 | Stop reason: HOSPADM

## 2025-01-04 RX ORDER — SIMETHICONE 80 MG
80 TABLET,CHEWABLE ORAL 4 TIMES DAILY PRN
Status: DISCONTINUED | OUTPATIENT
Start: 2025-01-04 | End: 2025-01-06 | Stop reason: HOSPADM

## 2025-01-04 RX ORDER — METOCLOPRAMIDE HYDROCHLORIDE 5 MG/ML
10 INJECTION INTRAMUSCULAR; INTRAVENOUS EVERY 6 HOURS PRN
Status: DISCONTINUED | OUTPATIENT
Start: 2025-01-04 | End: 2025-01-06 | Stop reason: HOSPADM

## 2025-01-04 RX ORDER — ONDANSETRON 4 MG/1
4 TABLET, FILM COATED ORAL EVERY 6 HOURS PRN
Status: DISCONTINUED | OUTPATIENT
Start: 2025-01-04 | End: 2025-01-06 | Stop reason: HOSPADM

## 2025-01-04 RX ORDER — OXYTOCIN/0.9 % SODIUM CHLORIDE 30/500 ML
60 PLASTIC BAG, INJECTION (ML) INTRAVENOUS ONCE AS NEEDED
Status: COMPLETED | OUTPATIENT
Start: 2025-01-04 | End: 2025-01-04

## 2025-01-04 RX ORDER — ACETAMINOPHEN 325 MG/1
975 TABLET ORAL EVERY 6 HOURS SCHEDULED
Status: DISCONTINUED | OUTPATIENT
Start: 2025-01-04 | End: 2025-01-06 | Stop reason: HOSPADM

## 2025-01-04 RX ORDER — LIDOCAINE HCL/EPINEPHRINE/PF 2%-1:200K
VIAL (ML) INJECTION AS NEEDED
Status: DISCONTINUED | OUTPATIENT
Start: 2025-01-04 | End: 2025-01-04

## 2025-01-04 RX ORDER — DIPHENHYDRAMINE HYDROCHLORIDE 50 MG/ML
25 INJECTION INTRAMUSCULAR; INTRAVENOUS EVERY 4 HOURS PRN
Status: DISCONTINUED | OUTPATIENT
Start: 2025-01-04 | End: 2025-01-06 | Stop reason: HOSPADM

## 2025-01-04 RX ORDER — ONDANSETRON HYDROCHLORIDE 2 MG/ML
4 INJECTION, SOLUTION INTRAVENOUS EVERY 6 HOURS PRN
Status: DISCONTINUED | OUTPATIENT
Start: 2025-01-04 | End: 2025-01-06 | Stop reason: HOSPADM

## 2025-01-04 RX ORDER — METHYLERGONOVINE MALEATE 0.2 MG/ML
0.2 INJECTION INTRAVENOUS ONCE AS NEEDED
Status: DISCONTINUED | OUTPATIENT
Start: 2025-01-04 | End: 2025-01-06 | Stop reason: HOSPADM

## 2025-01-04 RX ORDER — MISOPROSTOL 200 UG/1
800 TABLET ORAL ONCE AS NEEDED
Status: COMPLETED | OUTPATIENT
Start: 2025-01-04 | End: 2025-01-04

## 2025-01-04 RX ORDER — MORPHINE SULFATE 0.5 MG/ML
INJECTION, SOLUTION EPIDURAL; INTRATHECAL; INTRAVENOUS AS NEEDED
Status: DISCONTINUED | OUTPATIENT
Start: 2025-01-04 | End: 2025-01-04

## 2025-01-04 RX ORDER — ADHESIVE BANDAGE
10 BANDAGE TOPICAL
Status: DISCONTINUED | OUTPATIENT
Start: 2025-01-04 | End: 2025-01-06 | Stop reason: HOSPADM

## 2025-01-04 RX ORDER — METOCLOPRAMIDE 10 MG/1
10 TABLET ORAL EVERY 6 HOURS PRN
Status: DISCONTINUED | OUTPATIENT
Start: 2025-01-04 | End: 2025-01-06 | Stop reason: HOSPADM

## 2025-01-04 RX ORDER — LIDOCAINE 560 MG/1
1 PATCH PERCUTANEOUS; TOPICAL; TRANSDERMAL
Status: DISCONTINUED | OUTPATIENT
Start: 2025-01-04 | End: 2025-01-06 | Stop reason: HOSPADM

## 2025-01-04 RX ORDER — LOPERAMIDE HYDROCHLORIDE 2 MG/1
4 CAPSULE ORAL EVERY 2 HOUR PRN
Status: DISCONTINUED | OUTPATIENT
Start: 2025-01-04 | End: 2025-01-06 | Stop reason: HOSPADM

## 2025-01-04 RX ORDER — NALOXONE HYDROCHLORIDE 0.4 MG/ML
0.1 INJECTION, SOLUTION INTRAMUSCULAR; INTRAVENOUS; SUBCUTANEOUS EVERY 5 MIN PRN
Status: DISCONTINUED | OUTPATIENT
Start: 2025-01-04 | End: 2025-01-06 | Stop reason: HOSPADM

## 2025-01-04 RX ORDER — HYDROMORPHONE HYDROCHLORIDE 0.2 MG/ML
0.2 INJECTION INTRAMUSCULAR; INTRAVENOUS; SUBCUTANEOUS EVERY 5 MIN PRN
Status: DISCONTINUED | OUTPATIENT
Start: 2025-01-04 | End: 2025-01-04 | Stop reason: HOSPADM

## 2025-01-04 RX ORDER — OXYCODONE HYDROCHLORIDE 5 MG/1
5 TABLET ORAL EVERY 4 HOURS PRN
Status: DISCONTINUED | OUTPATIENT
Start: 2025-01-05 | End: 2025-01-06 | Stop reason: HOSPADM

## 2025-01-04 RX ORDER — OXYCODONE HYDROCHLORIDE 10 MG/1
10 TABLET ORAL EVERY 4 HOURS PRN
Status: DISCONTINUED | OUTPATIENT
Start: 2025-01-05 | End: 2025-01-06 | Stop reason: HOSPADM

## 2025-01-04 RX ORDER — AMOXICILLIN 250 MG
2 CAPSULE ORAL NIGHTLY PRN
Status: DISCONTINUED | OUTPATIENT
Start: 2025-01-04 | End: 2025-01-06 | Stop reason: HOSPADM

## 2025-01-04 RX ORDER — PHENYLEPHRINE 10 MG/250 ML(40 MCG/ML)IN 0.9 % SOD.CHLORIDE INTRAVENOUS
CONTINUOUS PRN
Status: DISCONTINUED | OUTPATIENT
Start: 2025-01-04 | End: 2025-01-04

## 2025-01-04 RX ORDER — POLYETHYLENE GLYCOL 3350 17 G/17G
17 POWDER, FOR SOLUTION ORAL 2 TIMES DAILY PRN
Status: DISCONTINUED | OUTPATIENT
Start: 2025-01-04 | End: 2025-01-06 | Stop reason: HOSPADM

## 2025-01-04 RX ORDER — CEFAZOLIN 1 G/1
INJECTION, POWDER, FOR SOLUTION INTRAVENOUS AS NEEDED
Status: DISCONTINUED | OUTPATIENT
Start: 2025-01-04 | End: 2025-01-04

## 2025-01-04 RX ADMIN — MISOPROSTOL 800 MCG: 200 TABLET ORAL at 06:53

## 2025-01-04 RX ADMIN — AZITHROMYCIN 500 MG: 500 INJECTION, POWDER, LYOPHILIZED, FOR SOLUTION INTRAVENOUS at 05:21

## 2025-01-04 RX ADMIN — ONDANSETRON 4 MG: 2 INJECTION INTRAMUSCULAR; INTRAVENOUS at 15:01

## 2025-01-04 RX ADMIN — ACETAMINOPHEN 975 MG: 325 TABLET ORAL at 13:06

## 2025-01-04 RX ADMIN — Medication 10 ML/HR: at 04:39

## 2025-01-04 RX ADMIN — Medication 120 MCG: at 05:56

## 2025-01-04 RX ADMIN — PHENYLEPHRINE-NACL IV SOLUTION 10 MG/250ML-0.9% 0.74 MCG/KG/MIN: 10-0.9/25 SOLUTION at 05:56

## 2025-01-04 RX ADMIN — ONDANSETRON 4 MG: 2 INJECTION, SOLUTION INTRAMUSCULAR; INTRAVENOUS at 05:09

## 2025-01-04 RX ADMIN — OXYTOCIN 600 MILLI-UNITS/MIN: 10 INJECTION, SOLUTION INTRAMUSCULAR; INTRAVENOUS at 05:35

## 2025-01-04 RX ADMIN — LIDOCAINE HYDROCHLORIDE,EPINEPHRINE BITARTRATE 5 ML: 20; .005 INJECTION, SOLUTION EPIDURAL; INFILTRATION; INTRACAUDAL; PERINEURAL at 05:02

## 2025-01-04 RX ADMIN — FENTANYL CITRATE 100 MCG: 50 INJECTION, SOLUTION INTRAMUSCULAR; INTRAVENOUS at 05:22

## 2025-01-04 RX ADMIN — ALBUMIN HUMAN 250 ML: 0.05 INJECTION, SOLUTION INTRAVENOUS at 06:00

## 2025-01-04 RX ADMIN — CEFAZOLIN 2 G: 1 INJECTION, POWDER, FOR SOLUTION INTRAMUSCULAR; INTRAVENOUS at 05:20

## 2025-01-04 RX ADMIN — KETOROLAC TROMETHAMINE 30 MG: 30 INJECTION, SOLUTION INTRAMUSCULAR; INTRAVENOUS at 13:06

## 2025-01-04 RX ADMIN — METOCLOPRAMIDE 10 MG: 5 INJECTION, SOLUTION INTRAMUSCULAR; INTRAVENOUS at 09:25

## 2025-01-04 RX ADMIN — SODIUM CHLORIDE: 0.9 INJECTION, SOLUTION INTRAVENOUS at 05:11

## 2025-01-04 RX ADMIN — Medication 60 MILLI-UNITS/MIN: at 08:01

## 2025-01-04 RX ADMIN — MORPHINE SULFATE 3 MG: 0.5 INJECTION EPIDURAL; INTRATHECAL; INTRAVENOUS at 06:17

## 2025-01-04 RX ADMIN — FAMOTIDINE 20 MG: 10 INJECTION, SOLUTION INTRAVENOUS at 05:09

## 2025-01-04 RX ADMIN — Medication 5 ML: at 04:43

## 2025-01-04 RX ADMIN — SODIUM CHLORIDE, POTASSIUM CHLORIDE, SODIUM LACTATE AND CALCIUM CHLORIDE 500 ML: 600; 310; 30; 20 INJECTION, SOLUTION INTRAVENOUS at 10:55

## 2025-01-04 RX ADMIN — SODIUM CHLORIDE, SODIUM LACTATE, POTASSIUM CHLORIDE, AND CALCIUM CHLORIDE: 600; 310; 30; 20 INJECTION, SOLUTION INTRAVENOUS at 05:53

## 2025-01-04 RX ADMIN — Medication 5 ML: at 04:40

## 2025-01-04 RX ADMIN — ACETAMINOPHEN 650 MG: 650 SUPPOSITORY RECTAL at 06:17

## 2025-01-04 RX ADMIN — ALBUMIN HUMAN 250 ML: 0.05 INJECTION, SOLUTION INTRAVENOUS at 06:34

## 2025-01-04 RX ADMIN — KETOROLAC TROMETHAMINE 30 MG: 30 INJECTION, SOLUTION INTRAMUSCULAR; INTRAVENOUS at 18:54

## 2025-01-04 RX ADMIN — SCOPOLAMINE 1 PATCH: 1.5 PATCH, EXTENDED RELEASE TRANSDERMAL at 15:24

## 2025-01-04 RX ADMIN — ACETAMINOPHEN 975 MG: 325 TABLET ORAL at 18:54

## 2025-01-04 RX ADMIN — KETOROLAC TROMETHAMINE 30 MG: 30 INJECTION, SOLUTION INTRAMUSCULAR; INTRAVENOUS at 06:12

## 2025-01-04 RX ADMIN — CEFAZOLIN 2 G: 1 INJECTION, POWDER, FOR SOLUTION INTRAMUSCULAR; INTRAVENOUS at 05:57

## 2025-01-04 RX ADMIN — ALBUMIN HUMAN 250 ML: 0.05 INJECTION, SOLUTION INTRAVENOUS at 06:10

## 2025-01-04 RX ADMIN — LIDOCAINE HYDROCHLORIDE,EPINEPHRINE BITARTRATE 2 ML: 20; .005 INJECTION, SOLUTION EPIDURAL; INFILTRATION; INTRACAUDAL; PERINEURAL at 05:13

## 2025-01-04 SDOH — HEALTH STABILITY: MENTAL HEALTH: CURRENT SMOKER: 0

## 2025-01-04 ASSESSMENT — PAIN DESCRIPTION - LOCATION: LOCATION: ABDOMEN

## 2025-01-04 ASSESSMENT — PAIN SCALES - GENERAL
PAINLEVEL_OUTOF10: 0 - NO PAIN
PAINLEVEL_OUTOF10: 1
PAINLEVEL_OUTOF10: 3
PAINLEVEL_OUTOF10: 6
PAINLEVEL_OUTOF10: 0 - NO PAIN
PAINLEVEL_OUTOF10: 3
PAINLEVEL_OUTOF10: 3
PAINLEVEL_OUTOF10: 0 - NO PAIN

## 2025-01-04 ASSESSMENT — PAIN DESCRIPTION - DESCRIPTORS
DESCRIPTORS: ACHING
DESCRIPTORS: ACHING

## 2025-01-04 NOTE — SIGNIFICANT EVENT
Decision for  Section    Irma Dobson is a 34 y.o.  at 38w0d who presented for external cephalic version in the setting of breech presentation that was ultimately unsuccessful. Fetal heart tracing remained re-assuring throughout and after the procedure. Postprocedure, patient had persistent lower abdominal pain and contractions. Discussed risks and benefits of delivery with patient, and she is agreeable to moving forward with  section. Risks of  including bleeding, infection, and damage to surrounding structures discussed. Patient expressed understanding. Will move forward with  section for breech presentation and due to not being able to rule out concealed abruption.      Ashley Velazquez MD

## 2025-01-04 NOTE — ANESTHESIA POSTPROCEDURE EVALUATION
Patient: Irma Dobson    Procedure Summary       Date: 25 Room / Location: Virtual [facility] 2 OB    Anesthesia Start: 453 Anesthesia Stop: 638    Procedure:  DELIVERY Diagnosis: (Breech)    Surgeons: Cristiane Proctor MD Responsible Provider: Edy Ballard MD    Anesthesia Type: epidural ASA Status: 2            Anesthesia Type: epidural    Vitals Value Taken Time   /59 25 0634   Temp 36.5 °C (97.7 °F) 25 0631   Pulse 80 25 0635   Resp 18 25 0630   SpO2 98 % 25 0635       Anesthesia Post Evaluation    Patient location during evaluation: bedside  Patient participation: complete - patient participated  Level of consciousness: awake and alert  Pain management: satisfactory to patient  Multimodal analgesia pain management approach  Airway patency: patent  Cardiovascular status: acceptable, hemodynamically stable and stable  Respiratory status: acceptable and room air  Hydration status: acceptable  Postoperative Nausea and Vomiting: none    No notable events documented.

## 2025-01-04 NOTE — L&D DELIVERY NOTE
OB Delivery Note  2025  Irma Dobson  34 y.o.   , Low Transverse       Gestational Age: 38w1d  /Para:   Quantitative Blood Loss: Admission to Discharge: 2050 mL (1/3/2025 10:52 AM - 2025  6:39 AM)    PreOp Dx:   - Breech presentation, sp failed ECV  - Behcet's disease   - Heterozygous Factor V Leiden, no personal hx DVT/PE, father passed away from presumed massive PE, for 6 week pp  lov ppx  - family hx of congenital heart disease, normal fetal echo this pregnancy    PostOp Dx: Same, adherent placenta,     Findings: Normal appearing gravid uterus, fallopian tubes, and ovaries. Amniotic fluid clear, female infant in breech presentation     Procedure: Pt was taken to the OR where combined spinal epidural anesthesia was administered.  She was then placed in the dorsal supine position with a left lateral tilt. A fischer catheter was placed, SCDs were applied, and a vaginal prep was performed. A pre-procedure time out was performed.  The pt was given prophylactic dose of IV antibiotics.  She was then prepped and draped in the usual sterile fashion. A Pfannenstiel skin incision was made with the scalpel through the skin and subcutaneous fat to the underlying fascial layer.  The fascia was incised in the midline with the scalpel and the incision was extended bilaterally. The superior aspect of the incision was grasped, tented up with Kocher clamps and the rectus muscle was dissected off. The muscles were divided in the midline, the peritoneum was then identified and entered bluntly and incision extended superiorly and inferiorly taking care to avoid underlying viscera.  The bladder blade was inserted.     A low transverse uterine incision was made with the scalpel, the uterine cavity was entered, and the hysterotomy was extended by cephalocaudal by stretch.  The infant was delivered breech, first delivering the feet and lefts, then turning the  prone, and delivering each arm, and then  the head with fundal pressure atraumatically, the cord was clamped and cut and infant was handed off to awaiting nursing.  A cord blood sample was collected.  The placenta was then manually removed due to difficulty expressing. When examining the endometrium, no visualized placenta was seen and endometrium felt smooth.  The uterus was exteriorized and cleared of all clot and debris. The uterine incision was repaired using a running stitch of 0-Vicryl. A second layer of 0 Monocryl was placed in an imbricating fashion. Good hemostasis was noted.     The uterus was then placed back inside the pelvis, the gutters were cleared of all clots and debris.  The hysterotomy was again evaluated and found to be hemostatic, the underside of the fascia and bladder and the rectus muscles were also found to be hemostatic.  The fascia was closed using a running stitch of 0-PDS. The subcutaneous layer was irrigated, small bleeders were cauterized, and the subcutaneous layer was reapproximated using a running stitch of 2-0 Monocryl. The skin was closed with 3-0 Monocryl.  All counts were correct per nursing staff, the patient tolerated the procedure well.  Dr. Gonzalez was present for the entire procedure.  The patient was taken back to the recovery room in stable condition.    Upon postpartum fundal check, large amount of clots were expressed, but then with good tone and no additional clot. Labs to be obtained 2 hours postpartum. 800mcg cytotec placed rectally.       Terrence Dobson [[id number]]      Labor Events    Sac identifier: Sac 1  Rupture date/time: 2025 0533  Rupture type: Artificial  Fluid color: Clear  Fluid odor: None  Labor type:  Without Labor  Labor allowed to proceed with plans for an attempted vaginal birth?: No  Complications: None       Placenta    Placenta delivery date/time: 2025 0539  Placenta removal: Manual removal  Placenta appearance: Adherent  Placenta disposition: discarded       Cord     Vessels: 3 vessels  Complications: None  Delayed cord clamping?: Yes  Cord clamped date/time: 2025 05:35:00  Cord blood disposition: Discarded  Gases sent?: No       Lacerations    Episiotomy: None  Perineal laceration: None  Other lacerations?: No  Repair suture: None       Anesthesia    Method: Combined spinal-epidural, Epidural       Operative Delivery    Forceps attempted?: No  Vacuum extractor attempted?: No       Shoulder Dystocia    Shoulder dystocia present?: No        Delivery    Time head delivered: 2025 05:34:00  Birth date/time: 2025 05:34:00  Delivery type: , Low Transverse   categorization: primary   priority: non-scheduled, non-urgent  Indications for : Breech  Complications: None       Resuscitation    Method: Suctioning, Tactile stimulation       Apgars    Living status: Living  Apgar Component Scores:  1 min.:  5 min.:  10 min.:  15 min.:  20 min.:    Skin color:  0  1       Heart rate:  2  2       Reflex irritability:  2  2       Muscle tone:  2  2       Respiratory effort:  2  2       Total:  8  9       Apgars assigned by: ROSEMARIE KNIGHT       Delivery Providers    Delivering clinician: Jamee Gonzalez MD   Provider Role    Gladys Goldberg RN Delivery Nurse    Katja Rome RN Nursery Nurse    Ashley Velazquez MD Resident                     Ashley Velazquez MD

## 2025-01-04 NOTE — ANESTHESIA PREPROCEDURE EVALUATION
Patient: Irma Dobson    Evaluation Method: In-person visit    Procedure Information       Date/Time: 01/03/25 1235    Procedure: EXTERNAL CEPHALIC VERSION - breech version @ 38 wks    Location: [facility] 04 / Virtual [facility] 2 OB    Surgeons: Priscilla Garcia MD            Relevant Problems   Anesthesia   (+) PONV (postoperative nausea and vomiting)      Cardiac (within normal limits)      Pulmonary (within normal limits)      Neuro (within normal limits)      GI (within normal limits)      /Renal (within normal limits)      Liver (within normal limits)      Endocrine (within normal limits)      Hematology   (+) Heterozygous factor V Leiden affecting pregnancy in third trimester, antepartum (Multi)      Musculoskeletal (within normal limits)      HEENT (within normal limits)      GYN   (+) 35 weeks gestation of pregnancy ([facility])       Clinical information reviewed:    Allergies  Meds               NPO Detail:  No data recorded     OB/Gyn Evaluation    Present Pregnancy    Patient is pregnant now.   Obstetric History                Physical Exam    Airway  Mallampati: II  TM distance: >3 FB  Neck ROM: full     Cardiovascular - normal exam     Dental - normal exam     Pulmonary - normal exam     Abdominal          Anesthesia Plan    History of general anesthesia?: yes  History of complications of general anesthesia?: no    ASA 2     epidural     The patient is not a current smoker.  Education provided regarding risk of obstructive sleep apnea.  Anesthetic plan and risks discussed with patient.  Use of blood products discussed with patient who consented to blood products.    Plan discussed with CAA.

## 2025-01-04 NOTE — DISCHARGE INSTRUCTIONS
Any woman can have complications after the birth of a baby including a blood clot, a heart problem, hypertensive disorder/eclampsia, depression, hemorrhage, or infection. Notify all providers of your delivery date up to one year after birth.*       Call 911 or go to nearest emergency room right away if you have: PAIN or pressure in chest; OBSTRUCTED breathing or shortness of breath; SEIZURES; THOUGHTS of hurting yourself or your baby; heart palpitations/racing; change in alertness/confusion.    Call your provider if you have: BLEEDING, soaking through a pad/hour, or blood clots the size of an egg or bigger; INCISION (episiotomy stitches or  site) that is not healing (increased redness, pain, drainage/pus, or separation); RED or swollen leg/calf that is painful or warm to touch, especially in one leg more than the other; TEMPERATURE of 100.4 F or higher or chills; HEADACHE that does not get better with medicine, rest or hydration, or bad headache with vision changes like spots or flashing lights; increased swelling of face, hands or legs; severe cramps or upper right belly pain; red or swollen breast that is painful or warm to touch; an unusual, foul odor from your vaginal discharge; pain, burning, or difficulty during urination; severe constipation (more than 5 days); feelings of depression (such as depressed mood, loss of interest in enjoyable things, unable to care for yourself, trouble sleeping, lack of appetite, or feeling worthless).     If you can’t reach your provider or symptoms worsen, call 911 or go to nearest emergency room.   *Information obtained from [employer]’s: Save Your Life: Get Care for These POST-BIRTH Warning Signs/    “The American Academy of Pediatrics recommends that pacifier use is best avoided during the initiation of breastfeeding and used only after breastfeeding is well established.  In some infants, early pacifier use may interfere with establishment of good breastfeeding  practices, whereas in others it may indicate the presence of a breastfeeding problem that requires intervention.  Use of a pacifier may cause problems with latching, and lead to decreased milk supply by missing feeding opportunities.  Pacifiers may be used during painful procedures, but are not otherwise recommended while the infant is learning to breastfeed.”

## 2025-01-05 VITALS
HEIGHT: 68 IN | TEMPERATURE: 99.7 F | RESPIRATION RATE: 16 BRPM | BODY MASS INDEX: 22.52 KG/M2 | HEART RATE: 66 BPM | WEIGHT: 148.59 LBS | DIASTOLIC BLOOD PRESSURE: 57 MMHG | SYSTOLIC BLOOD PRESSURE: 116 MMHG | OXYGEN SATURATION: 95 %

## 2025-01-05 PROBLEM — D62 ABLA (ACUTE BLOOD LOSS ANEMIA): Status: ACTIVE | Noted: 2025-01-05

## 2025-01-05 PROBLEM — R03.0 ELEVATED BP WITHOUT DIAGNOSIS OF HYPERTENSION: Status: ACTIVE | Noted: 2025-01-05

## 2025-01-05 LAB
APTT PPP: 28 SECONDS (ref 27–38)
BLOOD EXPIRATION DATE: NORMAL
DISPENSE STATUS: NORMAL
ERYTHROCYTE [DISTWIDTH] IN BLOOD BY AUTOMATED COUNT: 13.2 % (ref 11.5–14.5)
FIBRINOGEN PPP-MCNC: 304 MG/DL (ref 200–400)
HCT VFR BLD AUTO: 26.5 % (ref 36–46)
HGB BLD-MCNC: 8.9 G/DL (ref 12–16)
INR PPP: 0.8 (ref 0.9–1.1)
MCH RBC QN AUTO: 32 PG (ref 26–34)
MCHC RBC AUTO-ENTMCNC: 33.6 G/DL (ref 32–36)
MCV RBC AUTO: 95 FL (ref 80–100)
NRBC BLD-RTO: 0 /100 WBCS (ref 0–0)
PLATELET # BLD AUTO: 139 X10*3/UL (ref 150–450)
PRODUCT BLOOD TYPE: 6200
PRODUCT CODE: NORMAL
PROTHROMBIN TIME: 9.2 SECONDS (ref 9.8–12.8)
RBC # BLD AUTO: 2.78 X10*6/UL (ref 4–5.2)
UNIT ABO: NORMAL
UNIT NUMBER: NORMAL
UNIT RH: NORMAL
UNIT VOLUME: 350
UNIT VOLUME: 75
UNIT VOLUME: 96
WBC # BLD AUTO: 8.8 X10*3/UL (ref 4.4–11.3)
XM INTEP: NORMAL

## 2025-01-05 PROCEDURE — 1210000001 HC SEMI-PRIVATE ROOM DAILY

## 2025-01-05 PROCEDURE — 2500000001 HC RX 250 WO HCPCS SELF ADMINISTERED DRUGS (ALT 637 FOR MEDICARE OP)

## 2025-01-05 PROCEDURE — 85027 COMPLETE CBC AUTOMATED: CPT

## 2025-01-05 PROCEDURE — 36415 COLL VENOUS BLD VENIPUNCTURE: CPT

## 2025-01-05 PROCEDURE — 2500000004 HC RX 250 GENERAL PHARMACY W/ HCPCS (ALT 636 FOR OP/ED)

## 2025-01-05 PROCEDURE — 85610 PROTHROMBIN TIME: CPT

## 2025-01-05 PROCEDURE — RXMED WILLOW AMBULATORY MEDICATION CHARGE

## 2025-01-05 PROCEDURE — 2500000004 HC RX 250 GENERAL PHARMACY W/ HCPCS (ALT 636 FOR OP/ED): Performed by: STUDENT IN AN ORGANIZED HEALTH CARE EDUCATION/TRAINING PROGRAM

## 2025-01-05 PROCEDURE — 85384 FIBRINOGEN ACTIVITY: CPT

## 2025-01-05 RX ORDER — ENOXAPARIN SODIUM 100 MG/ML
40 INJECTION SUBCUTANEOUS EVERY 24 HOURS
Qty: 12 ML | Refills: 1 | Status: SHIPPED | OUTPATIENT
Start: 2025-01-05 | End: 2025-03-06

## 2025-01-05 RX ORDER — FERROUS SULFATE 325(65) MG
65 TABLET ORAL
Qty: 30 TABLET | Refills: 1 | Status: SHIPPED | OUTPATIENT
Start: 2025-01-05 | End: 2025-03-06

## 2025-01-05 RX ADMIN — ACETAMINOPHEN 975 MG: 325 TABLET ORAL at 19:11

## 2025-01-05 RX ADMIN — IBUPROFEN 600 MG: 600 TABLET, FILM COATED ORAL at 19:11

## 2025-01-05 RX ADMIN — ACETAMINOPHEN 975 MG: 325 TABLET ORAL at 12:51

## 2025-01-05 RX ADMIN — KETOROLAC TROMETHAMINE 30 MG: 30 INJECTION, SOLUTION INTRAMUSCULAR; INTRAVENOUS at 00:26

## 2025-01-05 RX ADMIN — IBUPROFEN 600 MG: 600 TABLET, FILM COATED ORAL at 12:51

## 2025-01-05 RX ADMIN — ACETAMINOPHEN 975 MG: 325 TABLET ORAL at 06:55

## 2025-01-05 RX ADMIN — ENOXAPARIN SODIUM 40 MG: 100 INJECTION SUBCUTANEOUS at 22:42

## 2025-01-05 RX ADMIN — ACETAMINOPHEN 975 MG: 325 TABLET ORAL at 00:26

## 2025-01-05 RX ADMIN — IBUPROFEN 600 MG: 600 TABLET, FILM COATED ORAL at 06:56

## 2025-01-05 RX ADMIN — POLYETHYLENE GLYCOL 3350 17 G: 17 POWDER, FOR SOLUTION ORAL at 12:51

## 2025-01-05 ASSESSMENT — PAIN DESCRIPTION - DESCRIPTORS
DESCRIPTORS: CRAMPING
DESCRIPTORS: CRAMPING
DESCRIPTORS: SORE
DESCRIPTORS: SORE

## 2025-01-05 ASSESSMENT — PAIN SCALES - GENERAL
PAINLEVEL_OUTOF10: 5 - MODERATE PAIN
PAINLEVEL_OUTOF10: 6
PAINLEVEL_OUTOF10: 0 - NO PAIN
PAINLEVEL_OUTOF10: 3
PAINLEVEL_OUTOF10: 4
PAINLEVEL_OUTOF10: 3
PAINLEVEL_OUTOF10: 3

## 2025-01-05 ASSESSMENT — PAIN DESCRIPTION - LOCATION: LOCATION: ABDOMEN

## 2025-01-05 NOTE — CARE PLAN
The patient's goals for the shift include To rest    The clinical goals for the shift include To meet postpartum milestones    Problem: Postpartum hemorrhage  Goal: Hemodynamic stability and limit blood loss  Outcome: Progressing     Problem: Pain - Adult  Goal: Verbalizes/displays adequate comfort level or baseline comfort level  Outcome: Progressing     Problem: Safety - Adult  Goal: Free from fall injury  Outcome: Progressing     Problem: Postpartum  Goal: Experiences normal postpartum course  Outcome: Progressing  Goal: Appropriate maternal -  bonding  Outcome: Progressing  Goal: Establish and maintain infant feeding pattern for adequate nutrition  Outcome: Progressing  Goal: Incisions, wounds, or drain sites healing without S/S of infection  Outcome: Progressing  Goal: No s/sx infection  Outcome: Progressing  Goal: No s/sx of hemorrhage  Outcome: Progressing

## 2025-01-05 NOTE — ANESTHESIA POSTPROCEDURE EVALUATION
Patient: Irma Dobson    Procedure Summary       Date: 25 Room / Location: Virtual [facility] 2 OB    Anesthesia Start: 439 Anesthesia Stop: 638    Procedure:  DELIVERY Diagnosis: (Breech)    Surgeons: Cristiane Proctor MD Responsible Provider: Edy Ballard MD    Anesthesia Type: epidural ASA Status: 2            Anesthesia Type: epidural    Vitals Value Taken Time   /53 25 0804   Temp 36 °C (96.8 °F) 25 0721   Pulse 62 25 0830   Resp 16 25 0804   SpO2 98 % 25 0830       Anesthesia Post Evaluation    Patient location during evaluation: floor  Patient participation: complete - patient participated  Level of consciousness: awake  Pain management: adequate  Airway patency: patent  Cardiovascular status: acceptable  Respiratory status: acceptable  Hydration status: acceptable  Postoperative Nausea and Vomiting: none  Comments: Epidural catheter not in place at time of post op. Patient has full sensation, motor intact.  Site of epidural clean dry intact, no pain with palpation.        No notable events documented.

## 2025-01-05 NOTE — LACTATION NOTE
Lactation Consultant Note  Lactation Consultation  Reason for Consult: Initial assessment, Infant weight loss  Consultant Name: Yocasta Rodgers RN, IBCLC    Maternal Information       Maternal Assessment       Infant Assessment       Feeding Assessment  Unable to assess infant feeding at this time: Other (Comment) (patient up in the restroom/ shower-)    LATCH TOOL       Breast Pump       Other OB Lactation Tools       Patient Follow-up       Other OB Lactation Documentation  Maternal Risk Factors:  delivery  Infant Risk Factors: Early term birth 37-39 weeks    Recommendations/Summary  Attempted to see patient, however, mom was up in the bathroom/ shower at this time.   Encouraged her to call for consult at the next feeding.

## 2025-01-05 NOTE — CARE PLAN
The patient's goals for the shift include bond with baby and sleep    The clinical goals for the shift include Have scant to light lochia    Patient ambulating and voiding with no complaints. Assessment and vitals per flowsheet. Pain controlled with PO medications per MAR. Patient

## 2025-01-05 NOTE — LACTATION NOTE
Lactation Consultant Note  Lactation Consultation  Reason for Consult: Initial assessment, Infant weight loss  Consultant Name: Yocasta Rodgers RN, IBCLC    Maternal Information  Has mother  before?: Yes  How long did the mother previously breastfeed?: breast fed her older son.  Previous Maternal Breastfeeding Challenges: Difficult latch, Other (Comment) (saw outpatient lactation for additional assistance)  Infant to breast within first 2 hours of birth?: Yes  Exclusive Pump and Bottle Feed: No    Maternal Assessment  Breast Assessment: Symmetrical, Compressible  Nipple Assessment: Intact, Erect, Sore  [place] Assessment: Normal    Infant Assessment  Infant Behavior: Feeding cues observed, Suckles on and off, needs stimulation, Readiness to feed  Infant Assessment: Other (Comment) (did not assess d/t mom latching the baby to the breast)    Feeding Assessment  Nutrition Source: Breastmilk  Feeding Method: Nursing at the breast, Feeding expressed breastmilk, Syringe feeding  Unable to assess infant feeding at this time: Other (Comment) (patient up in the restroom/ shower-)  Feeding Position: Cradle, Breast sandwich, One side per feeding, Other (Comment), Baby led (verbally encouraged her to use pillow support and adjust to a deeper latch. Mom was able to independently adjust the baby)  Suck/Feeding: Sustained, Coordinated suck/swallow/breathe, Baby led rhythmically, Other (Comment) (mom needed to adjust to a deeper latch a couple of times.)  Latch Assessment: Instructed on deep latch, Eagerly grasped on to latch, Deep latch obtained, Shallow latch, Optimal angle of mouth opening, Comfortable latch, Flanged lips    LATCH TOOL  Latch: Repeated attempts, hold nipple in mouth, stimulate to suck  Audible Swallowing: Spontaneous and intermittent (24 hours old)  Type of Nipple: Everted (After stimulation)  Comfort (Breast/Nipple): Soft/non-tender  Hold (Positioning): No assist from staff, mother able to  position/hold infant  LATCH Score: 9    Breast Pump  Pump: Hospital grade electric pump, Double breast pumping  Frequency: Other (comment) (encouraged her to pump after feeds and feed the baby the pumped breast milk obtained d/t weight loss)  Duration: Initiate phase  Breast Shield Size and Type: Other (comment) (mom verbalized her  was bringing her smaller flanges)  Units of Volume: mL per session    Other OB Lactation Tools       Patient Follow-up  Outpatient Lactation Follow-up: Recommended    Other OB Lactation Documentation  Maternal Risk Factors:  delivery  Infant Risk Factors: Early term birth 37-39 weeks    Recommendations/Summary  Called to room to view a latch.   Mom already latching the baby to the breast when I entered the room.   She stated she is able to independently latch the baby and denies any pain with the latch.   She stated she breast fed her first baby and had issues with latching but, utilized lactation assistance at [facility]  after discharge.     Observed mom latching the baby to the breast in cradle hold, baby led latching technique. She needed better pillow support of the baby and I encouraged her to use her breast feeding pillow.   Noted the baby could be a little deeper at the breast. Mom was able to independently adjust the baby to a deeper latch and she stated after adjustment the latch was comfortable.   Noted swallows.   Only observed and gave verbal guidance, mom did not need any hands on assistance.     Baby was down 6.09% at 24 hours and mom started to pump and she feeds the baby the pumped breast milk via syringe after the baby has been at breast. I encouraged her to continue to breast feed on demand, pump and give the pumped breast milk to the baby.   The baby had 2 voids and 5 stools in the first 24 hours of life.     Mom denies any questions or concerns at this time.   Encouraged her to call for assistance with feeds, if needed.     She has a breast pump for  at home.     Encouraged her to utilize the outpatient lactation resources, if needed.   Contact information given.   [phone] [facility] or [phone] Irene

## 2025-01-05 NOTE — CARE PLAN
Problem: Postpartum  Goal: Minimal s/sx of HDP and BP<160/110  Outcome: Progressing  Goal: Experiences normal postpartum course  Outcome: Progressing  Goal: Appropriate maternal -  bonding  Outcome: Progressing  Goal: Establish and maintain infant feeding pattern for adequate nutrition  Outcome: Progressing  Goal: Incisions, wounds, or drain sites healing without S/S of infection  Outcome: Progressing  Goal: No s/sx infection  Outcome: Progressing  Goal: No s/sx of hemorrhage  Outcome: Progressing   The patient's goals for the shift include To rest    The clinical goals for the shift include To meet postpartum milestones    Over the shift, the patient did  make progress toward the following goals. See assessment flowsheets.

## 2025-01-05 NOTE — PROGRESS NOTES
Postpartum Progress Note    Assessment/Plan   Irma Dobson is a 34 y.o., , who was admitted for ECV -> failed -> PLTCS for abdominal pain following ECV unable to r/o concealed abruption , delivered at 38w1d gestation and is now postpartum day 1 s/p , Low Transverse.     Routine postpartum care  - meeting all postpartum and post-op milestones  - voiding spontaneously, passing flatus, ambulating, tolerating PO diet  - bonding with female infant  - pain well controlled on po medications  - DVT Score: 5 - encourage ambulation,  SCDs, and  ppx lovenox  - A+, Rhogam not indicated  - PPBC: interval IUD    Acute Blood Loss Anemia/PPH  - hgb 12.5 -> EBL 2.3 L -> 7.1-> 2 cryo, 1u PRBC-> 8.6 -> POD1 8.9; stable  - Fibrinogen post op 185 -> 308 -> POD1 304  - adherent placenta, follow-up pathology   - PO Fe to be sent at [place]     Factor V Leiden   - no personal history of DVT/PE, father passed away from presumed massive PE  - for lovenox ppx 6 weeks postpartum     Elevated BP, no dx gHTN/PEC  - mild range BP x 1 on L&D (1/3 1353 - just after epidural placement)  - asymptomatic   - continue to monitor, if further mild range BP will meet criteria for gHTN     Bradycardia   - HR 50s-60s  - baseline per pt     Maternal Well-Being  - emotional support provided     Feeding  - breastfeeding/pumping encouraged; lactation consult prn    Dispo:  anticipate d/c on POD #3 if meeting all postpartum milestones, for f/u 2 weeks for incision check and 1 month with Primary OB provider    Bianka Gutierres PA-C  Vocyu    Assessment & Plan  Breech presentation, no version ([facility])    Malpresentation of fetus, antepartum ([facility])    PONV (postoperative nausea and vomiting)    Pregnancy Problems (from 07/15/24 to present)       Problem Noted Diagnosed Resolved    Malpresentation of fetus, antepartum ([facility]) 1/3/2025 by Jane Limon MD  No    Priority:  Medium       Malpresentation before onset of labor ([facility])  11/25/2024 by Zulay Butcher MD  No    Priority:  Medium       Overview Signed 11/25/2024  8:32 AM by Zulay Butcher MD     Trying inversions, discussed moxibustion         35 weeks gestation of pregnancy ([facility]) 10/28/2024 by Zulay Butcher MD  No    Priority:  Medium       Varicose veins of vulva and perineum 10/28/2024 by Zulay Butcher MD  No    Priority:  Medium       Overview Signed 10/28/2024  8:32 AM by Zulay Butcher MD     Wearing compression undergarment         Heterozygous factor V Leiden affecting pregnancy in third trimester, antepartum (Multi) 7/16/2024 by Zulay Butcher MD  No    Priority:  Medium       Overview Addendum 7/16/2024  4:23 PM by Zulay Butcher MD     No personal history of DVT/PE  Father passed away from PRESUMED massive PE  In prior pregnancy, surveillance only  ACOG PB reviewed, will plan for LMWH ppx dosing in the postpartum period         Family history of first degree relative with congenital heart disease 7/16/2024 by Zulay Butcher MD  No    Priority:  Medium       Overview Addendum 10/28/2024  8:29 AM by Zulay Butcher MD     FOB Brother with history of dextrocardia, TOGV  S/p normal fetal ECHO         Underweight 7/16/2024 by Zulay Butcher MD  No    Priority:  Medium       Overview Signed 7/16/2024  4:19 PM by Zulay Butcher MD     Prepreg BMI 18         Behcet's disease (Multi) 4/10/2003 by Zulay Butcher MD  No    Priority:  Medium       Overview Signed 7/16/2024  4:17 PM by Zulay Butcher MD     Generalized arthralgias  Usually ulcers, oral and vaginal (infrequent recurrence)               Subjective   Her pain is well controlled with current medications  She is passing flatus  She is ambulating well  She is tolerating a Adult diet Regular  She reports no breast or nursing problems  She denies emotional concerns today      Feeling well. Requesting discharge tomorrow.     Objective   Allergies:   Patient has no known allergies.         Last Vitals:  Temp  Pulse Resp BP MAP Pulse Ox   37.4 °C (99.3 °F) 57 18 114/71   96 %     Vitals Min/Max Last 24 Hours:  Temp  Min: 36.2 °C (97.2 °F)  Max: 37.4 °C (99.3 °F)  Pulse  Min: 47  Max: 61  Resp  Min: 15  Max: 18  BP  Min: 97/57  Max: 131/66    Physical Exam:  General: well appearing, well-nourished, postpartum  Obstetric: abdomen soft/non-tender, fundus firm below umbilicus, lochia light, Pfannenstiel incision c/d/i  Skin: No rashes/lesions/erythema  Neuro: A/Ox3, conversational  GI: +BS, +flatus  Respiratory: Even and unlabored on RA, LSCTA BL  Cardiovascular: RRR, normal S1, S2  Extremities: No edema, discoloration, or pain in BLE, equal and palpable DP and PT pulses    Psych: appropriate mood and affect     Lab Data:  Lab Results   Component Value Date    WBC 8.8 01/05/2025    HGB 8.9 (L) 01/05/2025    HCT 26.5 (L) 01/05/2025     (L) 01/05/2025

## 2025-01-05 NOTE — CARE PLAN
Problem: Postpartum  Goal: Minimal s/sx of HDP and BP<160/110  Outcome: Progressing  Goal: Experiences normal postpartum course  Outcome: Progressing  Goal: Appropriate maternal -  bonding  Outcome: Progressing  Goal: Establish and maintain infant feeding pattern for adequate nutrition  Outcome: Progressing  Goal: Incisions, wounds, or drain sites healing without S/S of infection  Outcome: Progressing  Goal: No s/sx infection  Outcome: Progressing  Goal: No s/sx of hemorrhage  Outcome: Progressing   The patient's goals for the shift include To rest    The clinical goals for the shift include To meet postpartum milestones    Over the shift, the patient did not make progress toward the following goals. Barriers to progression include ***. Recommendations to address these barriers include ***.

## 2025-01-06 ENCOUNTER — APPOINTMENT (OUTPATIENT)
Dept: OBSTETRICS AND GYNECOLOGY | Facility: CLINIC | Age: 35
End: 2025-01-06
Payer: COMMERCIAL

## 2025-01-06 ENCOUNTER — PHARMACY VISIT (OUTPATIENT)
Dept: PHARMACY | Facility: CLINIC | Age: 35
End: 2025-01-06
Payer: COMMERCIAL

## 2025-01-06 VITALS
DIASTOLIC BLOOD PRESSURE: 66 MMHG | OXYGEN SATURATION: 97 % | HEART RATE: 61 BPM | RESPIRATION RATE: 16 BRPM | TEMPERATURE: 99 F | WEIGHT: 148.59 LBS | HEIGHT: 68 IN | SYSTOLIC BLOOD PRESSURE: 114 MMHG | BODY MASS INDEX: 22.52 KG/M2

## 2025-01-06 LAB
ERYTHROCYTE [DISTWIDTH] IN BLOOD BY AUTOMATED COUNT: 13.6 % (ref 11.5–14.5)
HCT VFR BLD AUTO: 25.4 % (ref 36–46)
HGB BLD-MCNC: 8.8 G/DL (ref 12–16)
MCH RBC QN AUTO: 33.3 PG (ref 26–34)
MCHC RBC AUTO-ENTMCNC: 34.6 G/DL (ref 32–36)
MCV RBC AUTO: 96 FL (ref 80–100)
NRBC BLD-RTO: 0 /100 WBCS (ref 0–0)
PLATELET # BLD AUTO: 157 X10*3/UL (ref 150–450)
RBC # BLD AUTO: 2.64 X10*6/UL (ref 4–5.2)
WBC # BLD AUTO: 8.3 X10*3/UL (ref 4.4–11.3)

## 2025-01-06 PROCEDURE — 2500000001 HC RX 250 WO HCPCS SELF ADMINISTERED DRUGS (ALT 637 FOR MEDICARE OP)

## 2025-01-06 PROCEDURE — 36415 COLL VENOUS BLD VENIPUNCTURE: CPT

## 2025-01-06 PROCEDURE — 2500000004 HC RX 250 GENERAL PHARMACY W/ HCPCS (ALT 636 FOR OP/ED)

## 2025-01-06 PROCEDURE — 99238 HOSP IP/OBS DSCHRG MGMT 30/<: CPT

## 2025-01-06 PROCEDURE — 85027 COMPLETE CBC AUTOMATED: CPT

## 2025-01-06 RX ADMIN — IBUPROFEN 600 MG: 600 TABLET, FILM COATED ORAL at 06:58

## 2025-01-06 RX ADMIN — ACETAMINOPHEN 975 MG: 325 TABLET ORAL at 01:00

## 2025-01-06 RX ADMIN — ACETAMINOPHEN 975 MG: 325 TABLET ORAL at 06:58

## 2025-01-06 RX ADMIN — POLYETHYLENE GLYCOL 3350 17 G: 17 POWDER, FOR SOLUTION ORAL at 09:22

## 2025-01-06 RX ADMIN — IBUPROFEN 600 MG: 600 TABLET, FILM COATED ORAL at 01:00

## 2025-01-06 ASSESSMENT — PAIN SCALES - GENERAL
PAINLEVEL_OUTOF10: 5 - MODERATE PAIN
PAINLEVEL_OUTOF10: 2
PAINLEVEL_OUTOF10: 2

## 2025-01-06 ASSESSMENT — PAIN DESCRIPTION - DESCRIPTORS
DESCRIPTORS: ACHING;CRAMPING;SORE
DESCRIPTORS: CRAMPING

## 2025-01-06 NOTE — PROCEDURES
Procedure note:    Pre-op diagnosis: Breech presentation  Post-op diagnosis: Breech presentation  Indication: Breech presentation at 38 weeks gestation.   Anesthesia: Spinal  Surgeon: Priscilla Garcia  Assist: Karlie Campbell MD    After insuring informed consent, Spinal anesthesia was placed. Ultrasound confirmed breech presentation with head in the maternal left upper quadrant. FHT was noted to be reactive prior to starting the procedure. Terbutaline was given prior to starting the version.    Attempt to vert the fetus was made x3, first attempting to turn to the maternal left, and then to the maternal right. FHT were checked between attempts and noted to return to baseline. 4 attempts total at verting were made, fetus remained in a breech position at the termination of the procedure.     Patient was placed on external fetal monitors for monitoring at the end of the procedure. Patient tolerated procedure well.     I was present for the entire procedure.

## 2025-01-06 NOTE — CARE PLAN
The patient's goals for the shift include Bond with baby and sleep in between feeds.    The clinical goals for the shift include Have light to scant lochia    Patient ambulating and voiding with no complaints. Assessment and vitals per flowsheet. Pain controlled with PO medications per MAR. Patient did have a 94 gram clot, Dr. Castillo notified, no new orders at this time. No other clots on shift. Patient has no unanswered questions or concerns at this time.

## 2025-01-06 NOTE — DISCHARGE SUMMARY
Discharge Summary    Admission Date: 1/3/2025  Discharge Date: 25  Discharge Diagnosis:  delivery delivered ([facility])       Patient Active Problem List   Diagnosis    Heterozygous factor V Leiden affecting pregnancy in third trimester, antepartum (Multi)    Behcet's disease (Multi)    Family history of first degree relative with congenital heart disease    Underweight    Abnormal fetal echocardiography affecting antepartum care of mother, fetus 1 ([facility])    35 weeks gestation of pregnancy ([facility])    Varicose veins of vulva and perineum    Sacroiliac joint pain    Malpresentation before onset of labor ([facility])    Breech presentation, no version ([facility])     delivery delivered ([facility])    PONV (postoperative nausea and vomiting)    ABLA (acute blood loss anemia)    Elevated BP without diagnosis of hypertension         Hospital Course    Irma Dobson is a 34 y.o., , who was admitted for ECV -> failed -> PLTCS for abdominal pain following ECV unable to r/o concealed abruption , delivered at 38w1d gestation and is now postpartum day 2 s/p , Low Transverse     Admission Date: 1/3/2025    Delivery Date: 2025 5:34 AM    Delivery type: , Low Transverse     GA at delivery: 38w1d    Outcome: Living    Anesthesia during delivery: Combined spinal-epidural;Epidural    Intrapartum complications: None    Feeding method: Breastfeeding Status: Yes    Contraception: Defers to PPV, interested in interval IUD  Rhogam: The patient's blood type is A POS.  Rhogam is not indicated.    Hospital course notable for:    Routine postpartum care  - meeting all postpartum and post-op milestones  - voiding spontaneously, passing flatus, ambulating, tolerating PO diet  - bonding with female infant  - pain well controlled on ERAS protocol  - A+, Rhogam not indicated  - PPBC: interval IUD  -I have reviewed with the patient the standard 3 day stay s/p CS and the risks/benefits of early DC.  I strongly recommended the patient stay for 3 days. Pt verbalized understanding and desires DC today. She endorses feeling well and states she is ready to go home.       Acute Blood Loss Anemia/PPH  - hgb 12.5 -> EBL 2.3 L -> 7.1-> 2 cryo, 1u PRBC-> 8.6 -> POD1 8.9; stable  - Fibrinogen post op 185 -> 308 -> POD1 304  - adherent placenta, follow-up pathology   - PO Fe to be sent at DC      Factor V Leiden   - no personal history of DVT/PE, father passed away from presumed massive PE  - for lovenox ppx 6 weeks postpartum      Elevated BP, no dx gHTN/PEC  - mild range BP x 1 on L&D (1/3 1353 - just after epidural placement)  - asymptomatic   -largely normotensive    Behcet's disease   -Generalized arthralgias  -Usually ulcers, oral and vaginal (infrequent recurrence)  -No flares this pregnancy, symptoms controlled with out medications    Last Vitals  Temp Pulse Resp BP MAP O2 Sat   37.2 °C (99 °F) 61 16 114/66 82 97 %       Pertinent Subjective and Physical Exam At Time of Discharge  Patient seen at bedside, she is doing well with no acute events overnight. Her postpartum course was otherwise uneventful, and she appropriately met postpartum milestones. She remained hemodynamically stable, afebrile and normotensive. The physical examination was unremarkable, her abdomen was appropriately tender. Her lochia was stable and decreasing. She was ambulating without difficulty, voiding without difficulty and passing flatus. She was breast feeding without difficulty. Denies headaches, chest pain, shortness of breath, scotoma, or right upper quadrant pain.      PHYSICAL EXAM:  Constitutional: examination reveals a well developed, well nourished, female, in no acute distress. She is alert, pleasant and cooperative.  Cardiac: warm and well perfused, S1, S2, RRR  Respiratory:  Even and unlabored breathing in room air, clear to auscultation bilaterally. No crackles or wheezes.  Fundus: Firm and below umbilicus  Breast: No masses,  or nipple discharge  Derm: Skin color, texture, turgor normal. No rashes, or lesions.    Abdominal: soft, appropriately-tender, non-distended, active bowel sounds x4, no masses, no organomegaly  Extremities: Symmetrical, no redness or tenderness in the calves or thighs, Trace BLE edema, no discoloration  Neurological: PERRLA. Alert, oriented, normal speech, no focal findings or movement disorder noted.  Psychological: Appropriate mood and affect. Awake and alert; oriented to person, place, and time.   Skin: incision clean, dry, intact, well approximated, no redness, drainage, or warmth       Discharge Meds     Your medication list        START taking these medications        Instructions Last Dose Given Next Dose Due   enoxaparin 40 mg/0.4 mL syringe  Commonly known as: Lovenox      Inject 0.4 mL (40 mg) under the skin once every 24 hours.       ferrous sulfate (325 mg ferrous sulfate) tablet      Take 1 tablet (325 mg) by mouth once daily with breakfast.              CONTINUE taking these medications        Instructions Last Dose Given Next Dose Due   ondansetron ODT 4 mg disintegrating tablet  Commonly known as: Zofran-ODT      Dissolve 1 tablet in mouth every 6 hours as needed for nausea.       Prenatal Vitamin 27 mg iron- 800 mcg tablet  Generic drug: prenatal vit no.124-iron-folic                  STOP taking these medications      aspirin 81 mg EC tablet                  Where to Get Your Medications        These medications were sent to [facility]  [address], [address], [place] [address]      Hours: 8AM to 6PM Mon-Fri, 8AM to 4PM Sat, 9AM to 1PM Sun Phone: [phone]   enoxaparin 40 mg/0.4 mL syringe  ferrous sulfate (325 mg ferrous sulfate) tablet          Test Results Pending At Discharge  Pending Labs       Order Current Status    Surgical Pathology Exam - PLACENTA In process            Dispo:  The patient appropriate for discharge home, agrees with plan.       Outpatient  Follow-Up  No future appointments.   order placed to schedule 2 weeks for incision check and 4-6 weeks for routine postpartum visit  with Dr. Butcher.    I spent 20 minutes in the professional and overall care of this patient.      JAVIER Neely-ANA LAURA, CLC   01/06/25 8:56 AM  Vocera/secure chat

## 2025-01-08 ENCOUNTER — APPOINTMENT (OUTPATIENT)
Dept: OBSTETRICS AND GYNECOLOGY | Facility: CLINIC | Age: 35
End: 2025-01-08
Payer: COMMERCIAL

## 2025-01-09 LAB
LABORATORY COMMENT REPORT: NORMAL
PATH REPORT.FINAL DX SPEC: NORMAL
PATH REPORT.GROSS SPEC: NORMAL
PATH REPORT.RELEVANT HX SPEC: NORMAL
PATH REPORT.TOTAL CANCER: NORMAL

## 2025-01-29 PROCEDURE — RXMED WILLOW AMBULATORY MEDICATION CHARGE

## 2025-02-01 ENCOUNTER — PHARMACY VISIT (OUTPATIENT)
Dept: PHARMACY | Facility: CLINIC | Age: 35
End: 2025-02-01
Payer: COMMERCIAL

## 2025-02-05 ENCOUNTER — POSTPARTUM VISIT (OUTPATIENT)
Dept: OBSTETRICS AND GYNECOLOGY | Facility: CLINIC | Age: 35
End: 2025-02-05
Payer: COMMERCIAL

## 2025-02-05 VITALS
DIASTOLIC BLOOD PRESSURE: 70 MMHG | SYSTOLIC BLOOD PRESSURE: 117 MMHG | BODY MASS INDEX: 19.88 KG/M2 | WEIGHT: 131.2 LBS | HEART RATE: 80 BPM | HEIGHT: 68 IN

## 2025-02-05 DIAGNOSIS — Z30.430 ENCOUNTER FOR IUD INSERTION: ICD-10-CM

## 2025-02-05 PROBLEM — I86.3 VARICOSE VEINS OF VULVA AND PERINEUM: Status: RESOLVED | Noted: 2024-10-28 | Resolved: 2025-02-05

## 2025-02-05 PROBLEM — Z98.890 PONV (POSTOPERATIVE NAUSEA AND VOMITING): Status: RESOLVED | Noted: 2025-01-03 | Resolved: 2025-02-05

## 2025-02-05 PROBLEM — O99.113: Status: RESOLVED | Noted: 2024-07-16 | Resolved: 2025-02-05

## 2025-02-05 PROBLEM — O35.BXX1: Status: RESOLVED | Noted: 2024-09-18 | Resolved: 2025-02-05

## 2025-02-05 PROBLEM — Z82.79 FAMILY HISTORY OF FIRST DEGREE RELATIVE WITH CONGENITAL HEART DISEASE: Status: RESOLVED | Noted: 2024-07-16 | Resolved: 2025-02-05

## 2025-02-05 PROBLEM — R03.0 ELEVATED BP WITHOUT DIAGNOSIS OF HYPERTENSION: Status: RESOLVED | Noted: 2025-01-05 | Resolved: 2025-02-05

## 2025-02-05 PROBLEM — Z3A.35 35 WEEKS GESTATION OF PREGNANCY (HHS-HCC): Status: RESOLVED | Noted: 2024-10-28 | Resolved: 2025-02-05

## 2025-02-05 PROBLEM — D68.51: Status: RESOLVED | Noted: 2024-07-16 | Resolved: 2025-02-05

## 2025-02-05 PROBLEM — M53.3 SACROILIAC JOINT PAIN: Status: RESOLVED | Noted: 2024-10-28 | Resolved: 2025-02-05

## 2025-02-05 PROBLEM — R63.6 UNDERWEIGHT: Status: RESOLVED | Noted: 2024-07-16 | Resolved: 2025-02-05

## 2025-02-05 PROBLEM — R11.2 PONV (POSTOPERATIVE NAUSEA AND VOMITING): Status: RESOLVED | Noted: 2025-01-03 | Resolved: 2025-02-05

## 2025-02-05 PROBLEM — O32.9XX0 MALPRESENTATION BEFORE ONSET OF LABOR (HHS-HCC): Status: RESOLVED | Noted: 2024-11-25 | Resolved: 2025-02-05

## 2025-02-05 PROCEDURE — 58300 INSERT INTRAUTERINE DEVICE: CPT | Performed by: OBSTETRICS & GYNECOLOGY

## 2025-02-05 PROCEDURE — 99214 OFFICE O/P EST MOD 30 MIN: CPT | Mod: 25 | Performed by: OBSTETRICS & GYNECOLOGY

## 2025-02-05 ASSESSMENT — EDINBURGH POSTNATAL DEPRESSION SCALE (EPDS)
I HAVE BEEN ABLE TO LAUGH AND SEE THE FUNNY SIDE OF THINGS: AS MUCH AS I ALWAYS COULD
THINGS HAVE BEEN GETTING ON TOP OF ME: NO, I HAVE BEEN COPING AS WELL AS EVER
THE THOUGHT OF HARMING MYSELF HAS OCCURRED TO ME: NEVER
I HAVE LOOKED FORWARD WITH ENJOYMENT TO THINGS: AS MUCH AS I EVER DID
TOTAL SCORE: 0
I HAVE BEEN SO UNHAPPY THAT I HAVE HAD DIFFICULTY SLEEPING: NOT AT ALL
I HAVE FELT SCARED OR PANICKY FOR NO GOOD REASON: NO, NOT AT ALL
I HAVE BLAMED MYSELF UNNECESSARILY WHEN THINGS WENT WRONG: NO, NEVER
I HAVE BEEN ANXIOUS OR WORRIED FOR NO GOOD REASON: NO, NOT AT ALL
I HAVE BEEN SO UNHAPPY THAT I HAVE BEEN CRYING: NO, NEVER
I HAVE FELT SAD OR MISERABLE: NO, NOT AT ALL

## 2025-02-05 ASSESSMENT — ENCOUNTER SYMPTOMS
GASTROINTESTINAL NEGATIVE: 0
ENDOCRINE NEGATIVE: 0
RESPIRATORY NEGATIVE: 0
ALLERGIC/IMMUNOLOGIC NEGATIVE: 0
MUSCULOSKELETAL NEGATIVE: 0
CARDIOVASCULAR NEGATIVE: 0
LOSS OF SENSATION IN FEET: 0
OCCASIONAL FEELINGS OF UNSTEADINESS: 0
DEPRESSION: 0
NEUROLOGICAL NEGATIVE: 0
HEMATOLOGIC/LYMPHATIC NEGATIVE: 0
EYES NEGATIVE: 0
PSYCHIATRIC NEGATIVE: 0
CONSTITUTIONAL NEGATIVE: 0

## 2025-02-05 ASSESSMENT — PAIN SCALES - GENERAL: PAINLEVEL_OUTOF10: 0-NO PAIN

## 2025-02-06 NOTE — PROGRESS NOTES
Postpartum Visit Impressions      here for postpartum visit after     Postpartum care  - postpartum precautions reviewed, including PP depression  - feeding:  breastfeeding/pumping  - incisional hygiene reviewed  - pelvic floor therapy referral:  Not at this time    Pregnancy Comorbidities/Conditions  - Behcet's disease  - FVL heterozygous with likely family history of massive VTE --> has been doing Lovenox shots, will continue through 6 wks.      Contraception  - discussed importance of appropriate birth spacing  - desires to use IUD for birth control.  Inserted today.      Preventive health  - last Pap  --> due for repeat Pap   - return for annual exam              ---------------------------------------------------------------  HPI    34 y.o.  presenting for postpartum follow-up     Delivery Date: 2025  GA at Delivery: 38 1/7 wks  Type of Delivery: , Low Transverse     Pregnancy Problems (from 07/15/24 to present)       Problem Noted Diagnosed Resolved    Malpresentation before onset of labor ([facility]) 2024 by Zulay Butcher MD  No    Priority:  Medium       Overview Signed 2024  8:32 AM by Zulay Butcher MD     Trying inversions, discussed moxibustion         Heterozygous factor V Leiden affecting pregnancy in third trimester, antepartum (Multi) 2024 by Zulay Butcher MD  No    Priority:  Medium       Overview Addendum 2024  4:23 PM by Zulay Butcher MD     No personal history of DVT/PE  Father passed away from PRESUMED massive PE  In prior pregnancy, surveillance only  ACOG PB reviewed, will plan for LMWH ppx dosing in the postpartum period         Behcet's disease (Multi) 4/10/2003 by Zulay Butcher MD  No    Priority:  Medium       Overview Signed 2024  4:17 PM by Zulay Butcher MD     Generalized arthralgias  Usually ulcers, oral and vaginal (infrequent recurrence)         ABLA (acute blood loss anemia) 2025 by Bianka HIDALGO  KOLBY Gutierres  No            Concerns: none  Menses: No  Sexual Intimacy: No  Contraceptive Method: IUD  Feeding Method: She is breast feeding exclusively. no breast or nursing problems  Lactation Consult Needed?: No    Bonding with Baby: well with Female Sarah  Mood:   good    Physical Exam  Constitutional:       Appearance: Normal appearance.   Genitourinary:      Vulva normal.      Right Labia: No rash, lesions, skin changes or Bartholin's cyst.     Left Labia: No lesions, skin changes, Bartholin's cyst or rash.     No cervical discharge, lesion or polyp.      Uterus is retroverted.   HENT:      Head: Normocephalic and atraumatic.   Pulmonary:      Effort: Pulmonary effort is normal.   Musculoskeletal:      Cervical back: Neck supple.   Neurological:      Mental Status: She is alert.   Skin:     General: Skin is warm and dry.   Psychiatric:         Mood and Affect: Mood normal.         Behavior: Behavior normal.         Thought Content: Thought content normal.         Judgment: Judgment normal.       IUD insertion  Risks and benefits discussed.    Procedural consent obtained.  Offered intracervical block, Irma declined.    Desires hormonal IUD.    Patient was placed in lithotomy.    Bimanual exam performed, uterus retroverted.  Cervix normal.    Speculum placed with good visualization of the cervix    Betadine used to cleanse the cervix.    Tenaculum used to grasp the anterior lip of the cervix.    IUD sounded to 8.5 cm.    IUD inserted to fundus, withdrawn 2 cm, arms deployed, advanced to fundus, and remainder of IUD deployed according to manufactuer's recommendations.    Strings trimmed to 4 cm.    Patient tolerated procedure well.    Instruments were removed from the vagina.    Attempted to confirm fundal location with usn, but unable to completely visualize due to shadowing from  scar.

## 2025-02-18 ENCOUNTER — APPOINTMENT (OUTPATIENT)
Dept: OBSTETRICS AND GYNECOLOGY | Facility: CLINIC | Age: 35
End: 2025-02-18
Payer: COMMERCIAL

## 2025-06-02 ENCOUNTER — APPOINTMENT (OUTPATIENT)
Dept: OBSTETRICS AND GYNECOLOGY | Facility: CLINIC | Age: 35
End: 2025-06-02
Payer: COMMERCIAL

## 2025-06-06 ENCOUNTER — APPOINTMENT (OUTPATIENT)
Dept: OBSTETRICS AND GYNECOLOGY | Facility: CLINIC | Age: 35
End: 2025-06-06
Payer: COMMERCIAL

## 2025-06-06 VITALS
HEART RATE: 65 BPM | SYSTOLIC BLOOD PRESSURE: 117 MMHG | WEIGHT: 124.6 LBS | DIASTOLIC BLOOD PRESSURE: 87 MMHG | BODY MASS INDEX: 18.95 KG/M2

## 2025-06-06 DIAGNOSIS — Z30.431 INTRAUTERINE DEVICE SURVEILLANCE: ICD-10-CM

## 2025-06-06 DIAGNOSIS — Z01.419 WOMEN'S ANNUAL ROUTINE GYNECOLOGICAL EXAMINATION: Primary | ICD-10-CM

## 2025-06-06 PROCEDURE — 99395 PREV VISIT EST AGE 18-39: CPT | Performed by: OBSTETRICS & GYNECOLOGY

## 2025-06-06 PROCEDURE — 1036F TOBACCO NON-USER: CPT | Performed by: OBSTETRICS & GYNECOLOGY

## 2025-06-06 ASSESSMENT — EDINBURGH POSTNATAL DEPRESSION SCALE (EPDS)
I HAVE FELT SCARED OR PANICKY FOR NO GOOD REASON: NO, NOT AT ALL
I HAVE FELT SAD OR MISERABLE: NO, NOT AT ALL
THINGS HAVE BEEN GETTING ON TOP OF ME: NO, I HAVE BEEN COPING AS WELL AS EVER
THE THOUGHT OF HARMING MYSELF HAS OCCURRED TO ME: NEVER
I HAVE BEEN ABLE TO LAUGH AND SEE THE FUNNY SIDE OF THINGS: AS MUCH AS I ALWAYS COULD
I HAVE LOOKED FORWARD WITH ENJOYMENT TO THINGS: AS MUCH AS I EVER DID
I HAVE BEEN SO UNHAPPY THAT I HAVE BEEN CRYING: NO, NEVER
TOTAL SCORE: 0
I HAVE BEEN SO UNHAPPY THAT I HAVE HAD DIFFICULTY SLEEPING: NOT AT ALL
I HAVE BEEN ANXIOUS OR WORRIED FOR NO GOOD REASON: NO, NOT AT ALL
I HAVE BLAMED MYSELF UNNECESSARILY WHEN THINGS WENT WRONG: NO, NEVER

## 2025-06-06 ASSESSMENT — ENCOUNTER SYMPTOMS
RESPIRATORY NEGATIVE: 0
NEUROLOGICAL NEGATIVE: 0
ENDOCRINE NEGATIVE: 0
ALLERGIC/IMMUNOLOGIC NEGATIVE: 0
CONSTITUTIONAL NEGATIVE: 0
PSYCHIATRIC NEGATIVE: 0
HEMATOLOGIC/LYMPHATIC NEGATIVE: 0
GASTROINTESTINAL NEGATIVE: 0
CARDIOVASCULAR NEGATIVE: 0
EYES NEGATIVE: 0
MUSCULOSKELETAL NEGATIVE: 0

## 2025-06-06 ASSESSMENT — PATIENT HEALTH QUESTIONNAIRE - PHQ9
2. FEELING DOWN, DEPRESSED OR HOPELESS: NOT AT ALL
SUM OF ALL RESPONSES TO PHQ9 QUESTIONS 1 AND 2: 0
1. LITTLE INTEREST OR PLEASURE IN DOING THINGS: NOT AT ALL

## 2025-06-06 ASSESSMENT — PAIN SCALES - GENERAL: PAINLEVEL_OUTOF10: 0-NO PAIN

## 2025-06-06 NOTE — PROGRESS NOTES
Irma Dobson 34 y.o. y/o who presents for annual gyn exam.      Preventive health  Cervical cancer screening:  due in 2028  Breast cancer screening age 40  Colon cancer screening age 45    Reproductive Life Planning  Has LNG IUD in place.  Strings visualized.      -------------------------------------  HPI    Here for annual exam  Moving to [place] later this month for 's     breastfeeding    Baby is sleeping!    Gynecologic History:    Last Pap: 2023, NILM, neg HPV  Contraception: LNG IUD placed 2/2025    History reviewed and updated in patient's History Tab        Vitals:    06/06/25 0912   BP: 117/87   Pulse: 65       Physical Exam  Constitutional:       Appearance: Normal appearance.   Genitourinary:      Vulva normal.      No cervical lesion or polyp.      IUD strings visualized.   HENT:      Head: Normocephalic and atraumatic.   Pulmonary:      Effort: Pulmonary effort is normal.   Musculoskeletal:      Cervical back: Neck supple.   Neurological:      General: No focal deficit present.      Mental Status: She is alert.   Skin:     General: Skin is warm and dry.   Psychiatric:         Mood and Affect: Mood normal.

## 2025-06-23 ENCOUNTER — APPOINTMENT (OUTPATIENT)
Dept: OPHTHALMOLOGY | Facility: CLINIC | Age: 35
End: 2025-06-23
Payer: COMMERCIAL

## 2025-06-23 DIAGNOSIS — H52.13 MYOPIA OF BOTH EYES: Primary | ICD-10-CM

## 2025-06-23 DIAGNOSIS — M35.2 BEHCET'S DISEASE (MULTI): ICD-10-CM

## 2025-06-23 PROCEDURE — FLVLF CONTACT LENS EVALUATION (SP): Performed by: OPTOMETRIST

## 2025-06-23 PROCEDURE — 92015 DETERMINE REFRACTIVE STATE: CPT | Performed by: OPTOMETRIST

## 2025-06-23 PROCEDURE — V2520 CONTACT LENS HYDROPHILIC: HCPCS | Performed by: OPTOMETRIST

## 2025-06-23 PROCEDURE — 92014 COMPRE OPH EXAM EST PT 1/>: CPT | Performed by: OPTOMETRIST

## 2025-06-23 ASSESSMENT — REFRACTION_MANIFEST
OD_SPHERE: PLANO
OD_CYLINDER: +0.25
OD_SPHERE: -1.25
METHOD_AUTOREFRACTION: 1
OS_SPHERE: +0.50
OD_AXIS: 072
OS_SPHERE: -1.75
OS_CYLINDER: SPHERE
OD_CYLINDER: SPHERE
OS_CYLINDER: SPHERE

## 2025-06-23 ASSESSMENT — REFRACTION_CURRENTRX
OD_CYLINDER: SPHERE
OD_CYLINDER: SPHERE
OD_DIAMETER: 14.0
OS_SPHERE: -2.00
OS_CYLINDER: SPHERE
OS_DIAMETER: 14.0
OD_BRAND: ACUVUE OASYS
OD_SPHERE: -1.50
OS_DIAMETER: 14.0
OD_BASECURVE: 8.4
OD_BASECURVE: 8.4
OS_BRAND: ACUVUE OASYS
OS_CYLINDER: SPHERE
OS_BASECURVE: 8.4
OD_SPHERE: -1.75
OS_BRAND: ACUVUE OASYS
OD_BRAND: ACUVUE OASYS
OS_SPHERE: -2.25
OS_BASECURVE: 8.4
OD_DIAMETER: 14.0

## 2025-06-23 ASSESSMENT — ENCOUNTER SYMPTOMS
RESPIRATORY NEGATIVE: 0
EYES NEGATIVE: 1
PSYCHIATRIC NEGATIVE: 0
NEUROLOGICAL NEGATIVE: 0
CONSTITUTIONAL NEGATIVE: 0
MUSCULOSKELETAL NEGATIVE: 0
ENDOCRINE NEGATIVE: 0
ALLERGIC/IMMUNOLOGIC NEGATIVE: 0
GASTROINTESTINAL NEGATIVE: 0
CARDIOVASCULAR NEGATIVE: 0
HEMATOLOGIC/LYMPHATIC NEGATIVE: 0

## 2025-06-23 ASSESSMENT — VISUAL ACUITY
OS_CC: 20/20
OD_CC: 20/20
METHOD: SNELLEN - LINEAR
CORRECTION_TYPE: CONTACTS

## 2025-06-23 ASSESSMENT — TONOMETRY
IOP_METHOD: GOLDMANN APPLANATION
OD_IOP_MMHG: 15
OS_IOP_MMHG: 15

## 2025-06-23 ASSESSMENT — CUP TO DISC RATIO
OD_RATIO: .35, MALINSERTED
OS_RATIO: .35, MALINSERTED

## 2025-06-23 ASSESSMENT — CONF VISUAL FIELD
OD_SUPERIOR_NASAL_RESTRICTION: 0
OD_SUPERIOR_TEMPORAL_RESTRICTION: 0
OD_NORMAL: 1
OS_INFERIOR_NASAL_RESTRICTION: 0
OS_SUPERIOR_TEMPORAL_RESTRICTION: 0
OS_INFERIOR_TEMPORAL_RESTRICTION: 0
OS_NORMAL: 1
OD_INFERIOR_NASAL_RESTRICTION: 0
OD_INFERIOR_TEMPORAL_RESTRICTION: 0
OS_SUPERIOR_NASAL_RESTRICTION: 0
METHOD: COUNTING FINGERS

## 2025-06-23 ASSESSMENT — REFRACTION_WEARINGRX
OD_SPHERE: CLS
OS_SPHERE: CLS

## 2025-06-23 ASSESSMENT — SLIT LAMP EXAM - LIDS
COMMENTS: GOOD POSITION
COMMENTS: GOOD POSITION

## 2025-06-23 ASSESSMENT — EXTERNAL EXAM - LEFT EYE: OS_EXAM: NORMAL

## 2025-06-23 ASSESSMENT — EXTERNAL EXAM - RIGHT EYE: OD_EXAM: NORMAL

## 2025-06-23 NOTE — Clinical Note
Both eyes (OU) Contact Lens Order Current Contact Lens Rx #2 (Trial Lens)      Brand Base Curve Diameter Sphere Cylinder Lens Dist VA Centration  Movement   Right AcuVue Oasys 8.4 14.0 -1.50 Sphere Soft      Left AcuVue Oasys 8.4 14.0 -2.00 Sphere Soft         Quantity: 2 Package: 12 PK Appointment needed? No Medically necessary? No Ship To: Home-- [place], [place] address. Additional instructions: Please order 6 month supply and mail to patient's new address in [place], [place]. They are moving next week. Thank you!

## 2025-06-23 NOTE — PROGRESS NOTES
Assessment/Plan   Diagnoses and all orders for this visit:  Myopia of both eyes  New spec rx released today per patient request. Ocular health wnl for age OU. Monitor 1 year or sooner prn. Refraction billed today. Pt consents to receiving glasses Rx today. Patient's/guardian's signature obtained to acknowledge and confirm that a paper copy of glasses Rx was given to patient in compliance with [facility] Eyeglass Rule. Electronic copy of Rx will also be available via [employer].   Discussed proper wear, care, replacement of contact lenses. Gave handout. D/c cl wear and RTC if eyes become red, painful, irritated. Monitor 1 year.   CL eval billed today. $35. Slight overminus on habitual refractive correction, will adjust CL and specs.     Behcet's disease (Multi)  No evidence of active inflammation. Monitor.

## (undated) DEVICE — TOWEL PACK, STERILE, 4/PACK, BLUE

## (undated) DEVICE — DRAPE PACK, CESAREAN SECTION, CUSTOM, UHC